# Patient Record
Sex: FEMALE | Race: WHITE | Employment: OTHER | URBAN - METROPOLITAN AREA
[De-identification: names, ages, dates, MRNs, and addresses within clinical notes are randomized per-mention and may not be internally consistent; named-entity substitution may affect disease eponyms.]

---

## 2017-01-11 ENCOUNTER — OFFICE VISIT (OUTPATIENT)
Dept: OTOLARYNGOLOGY | Facility: CLINIC | Age: 72
End: 2017-01-11

## 2017-01-11 ENCOUNTER — TELEPHONE (OUTPATIENT)
Dept: OTOLARYNGOLOGY | Facility: CLINIC | Age: 72
End: 2017-01-11

## 2017-01-11 ENCOUNTER — OFFICE VISIT (OUTPATIENT)
Dept: AUDIOLOGY | Facility: CLINIC | Age: 72
End: 2017-01-11

## 2017-01-11 VITALS
HEART RATE: 64 BPM | DIASTOLIC BLOOD PRESSURE: 70 MMHG | TEMPERATURE: 98 F | SYSTOLIC BLOOD PRESSURE: 110 MMHG | WEIGHT: 100 LBS | BODY MASS INDEX: 18 KG/M2

## 2017-01-11 DIAGNOSIS — H93.12 TINNITUS, LEFT: Primary | ICD-10-CM

## 2017-01-11 DIAGNOSIS — H93.A2 PULSATILE TINNITUS OF LEFT EAR: Primary | ICD-10-CM

## 2017-01-11 PROCEDURE — 92570 ACOUSTIC IMMITANCE TESTING: CPT | Performed by: AUDIOLOGIST

## 2017-01-11 PROCEDURE — 99244 OFF/OP CNSLTJ NEW/EST MOD 40: CPT | Performed by: OTOLARYNGOLOGY

## 2017-01-11 PROCEDURE — G0463 HOSPITAL OUTPT CLINIC VISIT: HCPCS | Performed by: OTOLARYNGOLOGY

## 2017-01-11 PROCEDURE — 92557 COMPREHENSIVE HEARING TEST: CPT | Performed by: AUDIOLOGIST

## 2017-01-11 NOTE — PROGRESS NOTES
AUDIOGRAM     Aguila Richmond was referred for testing by Dr. Kin Rajput for left sided pulsatile tinnitus   8/6/1945  [id number]      Otoscopic inspection: right ear no cerumen; left ear no cerumen.        Tests/Procedures  Patient was tested via  st

## 2017-01-11 NOTE — TELEPHONE ENCOUNTER
Per [facility] no prior auth required for CPT [id number]; ref # [id number]. Pt informed of this, and that she should contact her ins company to verify benefits and any OOP costs. Pt verbalized understanding.

## 2017-01-11 NOTE — PATIENT INSTRUCTIONS
Anatomy of the Brain  The brain controls the body. You can move and feel because of the brain. And it is the brain that makes you able to think, to show emotions, and to make judgments. The brain is protected by the skull, tissue, and fluid.   Functions o

## 2017-01-11 NOTE — PROGRESS NOTES
Vicki Duran is a 66year old female. Patient presents with:  Ear Problem: Pt presents with throbbing sound in left ear x several months.  Pt state sometimes dizziness      1/11/2017   Patient presents for evaluation of tinnitus This has been going on Carin for diverticulitis    INGUINAL HERNIA REPAIR  11/2014    Comment repair of incarcerated incisional hernia L groin Dr. Vannessa Mcdonnell Bilateral 2006    Comment Dr Nasim Angel Neg/Pos Details   Con Supraclavicular. Nose/Mouth/Throat Normal External nose - Normal. Lips/teeth/gums - Normal. Tonsilsnl. uvula is midline.  Tongue is normal with  Normal mobility and mucosa is free of lesions Oropharynx -cobblestoning      Nares - Right: Normal Left

## 2017-01-15 ENCOUNTER — HOSPITAL ENCOUNTER (OUTPATIENT)
Dept: CT IMAGING | Facility: HOSPITAL | Age: 72
Discharge: HOME | End: 2017-01-15
Attending: OTOLARYNGOLOGY
Payer: MEDICARE

## 2017-01-15 DIAGNOSIS — H93.A2 PULSATILE TINNITUS OF LEFT EAR: ICD-10-CM

## 2017-01-15 LAB — CREAT BLD-MCNC: 0.8 MG/DL (ref 0.5–1.5)

## 2017-01-15 PROCEDURE — 82565 ASSAY OF CREATININE: CPT

## 2017-01-15 PROCEDURE — 70482 CT ORBIT/EAR/FOSSA W/O&W/DYE: CPT

## 2017-01-17 ENCOUNTER — TELEPHONE (OUTPATIENT)
Dept: OTOLARYNGOLOGY | Facility: CLINIC | Age: 72
End: 2017-01-17

## 2017-02-15 ENCOUNTER — TELEPHONE (OUTPATIENT)
Dept: INTERNAL MEDICINE CLINIC | Facility: CLINIC | Age: 72
End: 2017-02-15

## 2017-02-16 NOTE — TELEPHONE ENCOUNTER
To nursing, please fax back to the [facility] fitness center physicians consent form that I signed. Please let patient know that was faxed. Thanks.

## 2017-03-07 ENCOUNTER — LAB REQUISITION (OUTPATIENT)
Dept: LAB | Facility: HOSPITAL | Age: 72
End: 2017-03-07
Payer: MEDICARE

## 2017-03-07 DIAGNOSIS — M67.441 GANGLION OF RIGHT HAND: ICD-10-CM

## 2017-03-07 PROCEDURE — 88304 TISSUE EXAM BY PATHOLOGIST: CPT | Performed by: SURGERY

## 2017-03-07 PROCEDURE — 88305 TISSUE EXAM BY PATHOLOGIST: CPT | Performed by: SURGERY

## 2017-04-26 ENCOUNTER — OFFICE VISIT (OUTPATIENT)
Dept: OCCUPATIONAL MEDICINE | Facility: HOSPITAL | Age: 72
End: 2017-04-26
Attending: SURGERY
Payer: MEDICARE

## 2017-04-26 DIAGNOSIS — Z98.890 STATUS POST DEBRIDEMENT OF BONE SPUR: ICD-10-CM

## 2017-04-26 DIAGNOSIS — M25.649 THUMB JOINT STIFFNESS: Primary | ICD-10-CM

## 2017-04-26 DIAGNOSIS — L90.5: ICD-10-CM

## 2017-04-26 PROCEDURE — 97165 OT EVAL LOW COMPLEX 30 MIN: CPT | Performed by: OCCUPATIONAL THERAPIST

## 2017-04-26 PROCEDURE — 97530 THERAPEUTIC ACTIVITIES: CPT | Performed by: OCCUPATIONAL THERAPIST

## 2017-04-26 NOTE — PROGRESS NOTES
OCCUPATIONAL THERAPY UPPER EXTREMITY EVALUATION:   Referring Physician: Dr. Hiram Elias  Date of onset: 2016  Diagnosis: S/P removal of bone spurs and cyst from dorsum of thumb  Date of Service: 4/26/2017  Date of Surgery: 03/07/17     PATIENT SUMMARY:   Laurie Gallo above- noted skills. Precautions: None        OBJECTIVE:   OBSERVATION:Patient was seen for initial evaluation, fluidotherapy, AROM, isometric exercises and HEP instruction.     SCAR: Adhered Mod    SENSORY: tingling over scar      AROM:  Shoulder  Wri program    Education or treatment limitation: None  Rehab Potential:good     FOTO: 70/100  Current status G Code: Initial Carrying, Moving and Handling Objects CJ: 20-39% impaired, limited, or restricted  Goal status G Code: Carrying, Moving and Handling O

## 2017-04-28 ENCOUNTER — OFFICE VISIT (OUTPATIENT)
Dept: OCCUPATIONAL MEDICINE | Facility: HOSPITAL | Age: 72
End: 2017-04-28
Attending: SURGERY
Payer: MEDICARE

## 2017-04-28 DIAGNOSIS — Z98.890 STATUS POST DEBRIDEMENT OF BONE SPUR: ICD-10-CM

## 2017-04-28 DIAGNOSIS — L90.5: ICD-10-CM

## 2017-04-28 DIAGNOSIS — M25.649 THUMB JOINT STIFFNESS: Primary | ICD-10-CM

## 2017-04-28 PROCEDURE — 97110 THERAPEUTIC EXERCISES: CPT | Performed by: OCCUPATIONAL THERAPIST

## 2017-04-28 PROCEDURE — 97140 MANUAL THERAPY 1/> REGIONS: CPT | Performed by: OCCUPATIONAL THERAPIST

## 2017-04-28 PROCEDURE — 97035 APP MDLTY 1+ULTRASOUND EA 15: CPT | Performed by: OCCUPATIONAL THERAPIST

## 2017-04-28 NOTE — PROGRESS NOTES
Diagnosis:  S/P removal of bone spurs and cyst from dorsum of thumb  Authorized # of Visits:  8         Next MD visit: none scheduled  Fall Risk: standard         Precautions: n/a           Medication Changes since last visit?: No  Subjective:  \"The top

## 2017-05-01 ENCOUNTER — OFFICE VISIT (OUTPATIENT)
Dept: OCCUPATIONAL MEDICINE | Facility: HOSPITAL | Age: 72
End: 2017-05-01
Attending: SURGERY
Payer: MEDICARE

## 2017-05-01 DIAGNOSIS — Z98.890 STATUS POST DEBRIDEMENT OF BONE SPUR: ICD-10-CM

## 2017-05-01 DIAGNOSIS — M25.649 THUMB JOINT STIFFNESS: Primary | ICD-10-CM

## 2017-05-01 DIAGNOSIS — L90.5: ICD-10-CM

## 2017-05-01 PROCEDURE — 97110 THERAPEUTIC EXERCISES: CPT | Performed by: OCCUPATIONAL THERAPIST

## 2017-05-01 PROCEDURE — 97140 MANUAL THERAPY 1/> REGIONS: CPT | Performed by: OCCUPATIONAL THERAPIST

## 2017-05-01 PROCEDURE — 97018 PARAFFIN BATH THERAPY: CPT | Performed by: OCCUPATIONAL THERAPIST

## 2017-05-01 NOTE — PROGRESS NOTES
Diagnosis:  S/P removal of bone spurs and cyst from dorsum of thumb  Authorized # of Visits:  8         Next MD visit: none scheduled  Fall Risk: standard         Precautions: n/a           Medication Changes since last visit?: No  Subjective: \"I can wr and promoting thumb IP AROM, trial fluidotherapy next session      Charges: MTTE2P Total Timed Treatment: 45 min  Total Treatment Time: 50 min

## 2017-05-03 ENCOUNTER — APPOINTMENT (OUTPATIENT)
Dept: OCCUPATIONAL MEDICINE | Facility: HOSPITAL | Age: 72
End: 2017-05-03
Attending: SURGERY
Payer: MEDICARE

## 2017-05-08 ENCOUNTER — OFFICE VISIT (OUTPATIENT)
Dept: OCCUPATIONAL MEDICINE | Facility: HOSPITAL | Age: 72
End: 2017-05-08
Attending: SURGERY
Payer: MEDICARE

## 2017-05-08 DIAGNOSIS — Z98.890 STATUS POST DEBRIDEMENT OF BONE SPUR: ICD-10-CM

## 2017-05-08 DIAGNOSIS — L90.5: ICD-10-CM

## 2017-05-08 DIAGNOSIS — M25.649 THUMB JOINT STIFFNESS: Primary | ICD-10-CM

## 2017-05-08 PROCEDURE — 97140 MANUAL THERAPY 1/> REGIONS: CPT | Performed by: OCCUPATIONAL THERAPIST

## 2017-05-08 PROCEDURE — 97110 THERAPEUTIC EXERCISES: CPT | Performed by: OCCUPATIONAL THERAPIST

## 2017-05-08 NOTE — PROGRESS NOTES
Diagnosis:  S/P removal of bone spurs and cyst from dorsum of thumb  Authorized # of Visits:  8         Next MD visit: none scheduled  Fall Risk: standard         Precautions: n/a           Medication Changes since last visit?: No  Subjective: \"I still session well. Slight increase in thumb flexion by 7 degrees. Goals:    Pt will present with improved Functional Severity modifier to:CI  Pt complaints of pain in thumb will decrease at worst to 1/10.   Pt will be independent and compliant with comprehe

## 2017-05-10 ENCOUNTER — APPOINTMENT (OUTPATIENT)
Dept: OCCUPATIONAL MEDICINE | Facility: HOSPITAL | Age: 72
End: 2017-05-10
Attending: SURGERY
Payer: MEDICARE

## 2017-05-15 ENCOUNTER — OFFICE VISIT (OUTPATIENT)
Dept: OCCUPATIONAL MEDICINE | Facility: HOSPITAL | Age: 72
End: 2017-05-15
Attending: SURGERY
Payer: MEDICARE

## 2017-05-15 DIAGNOSIS — M25.649 THUMB JOINT STIFFNESS: Primary | ICD-10-CM

## 2017-05-15 DIAGNOSIS — Z98.890 STATUS POST DEBRIDEMENT OF BONE SPUR: ICD-10-CM

## 2017-05-15 DIAGNOSIS — L90.5: ICD-10-CM

## 2017-05-15 PROCEDURE — 97110 THERAPEUTIC EXERCISES: CPT | Performed by: OCCUPATIONAL THERAPIST

## 2017-05-15 PROCEDURE — 97035 APP MDLTY 1+ULTRASOUND EA 15: CPT | Performed by: OCCUPATIONAL THERAPIST

## 2017-05-15 PROCEDURE — 97140 MANUAL THERAPY 1/> REGIONS: CPT | Performed by: OCCUPATIONAL THERAPIST

## 2017-05-15 NOTE — PROGRESS NOTES
Diagnosis:  S/P removal of bone spurs and cyst from dorsum of thumb  Authorized # of Visits:  8         Next MD visit: none scheduled  Fall Risk: standard         Precautions: n/a           Medication Changes since last visit?: No  Subjective: \"I think continuous 100% 3.0 MHZ, 0.9 w/cm2 8 min over dorsum of thumb IP      Scar massage- 5 min Thumb extension flicks of POM POMs Velcro checkers, two point pinch, and three point pinch with green CP Velcro checkers, two point pinch, and three point pinch with

## 2017-05-17 ENCOUNTER — OFFICE VISIT (OUTPATIENT)
Dept: OCCUPATIONAL MEDICINE | Facility: HOSPITAL | Age: 72
End: 2017-05-17
Attending: SURGERY
Payer: MEDICARE

## 2017-05-17 DIAGNOSIS — M25.649 THUMB JOINT STIFFNESS: Primary | ICD-10-CM

## 2017-05-17 DIAGNOSIS — Z98.890 STATUS POST DEBRIDEMENT OF BONE SPUR: ICD-10-CM

## 2017-05-17 DIAGNOSIS — L90.5: ICD-10-CM

## 2017-05-17 PROCEDURE — 97035 APP MDLTY 1+ULTRASOUND EA 15: CPT | Performed by: OCCUPATIONAL THERAPIST

## 2017-05-17 PROCEDURE — 97140 MANUAL THERAPY 1/> REGIONS: CPT | Performed by: OCCUPATIONAL THERAPIST

## 2017-05-17 PROCEDURE — 97110 THERAPEUTIC EXERCISES: CPT | Performed by: OCCUPATIONAL THERAPIST

## 2017-05-17 NOTE — PROGRESS NOTES
Diagnosis:  S/P removal of bone spurs and cyst from dorsum of thumb  Authorized # of Visits:  8         Next MD visit: none scheduled  Fall Risk: standard         Precautions: n/a           Medication Changes since last visit?: No  Subjective: \"I think Sensory bin#3 large chips Sensory bin#3 large chips Sensory bin#3 large chips       Ultrasound pulsed 20%, 3.0 MHZ, 0.9 w/cm2 8 min over dorsum of thumb IP Pronation supination with 2# wt x 20, 1 set Pronation supination with 2# wt x 20, 1 set Ultrasound

## 2017-05-22 ENCOUNTER — APPOINTMENT (OUTPATIENT)
Dept: OCCUPATIONAL MEDICINE | Facility: HOSPITAL | Age: 72
End: 2017-05-22
Attending: SURGERY
Payer: MEDICARE

## 2017-05-24 ENCOUNTER — APPOINTMENT (OUTPATIENT)
Dept: OCCUPATIONAL MEDICINE | Facility: HOSPITAL | Age: 72
End: 2017-05-24
Attending: SURGERY
Payer: MEDICARE

## 2017-06-05 ENCOUNTER — APPOINTMENT (OUTPATIENT)
Dept: OCCUPATIONAL MEDICINE | Facility: HOSPITAL | Age: 72
End: 2017-06-05
Attending: SURGERY
Payer: MEDICARE

## 2017-06-08 ENCOUNTER — APPOINTMENT (OUTPATIENT)
Dept: OCCUPATIONAL MEDICINE | Facility: HOSPITAL | Age: 72
End: 2017-06-08
Attending: SURGERY
Payer: MEDICARE

## 2017-08-30 ENCOUNTER — LAB ENCOUNTER (OUTPATIENT)
Dept: LAB | Age: 72
End: 2017-08-30
Attending: INTERNAL MEDICINE
Payer: MEDICARE

## 2017-08-30 ENCOUNTER — TELEPHONE (OUTPATIENT)
Dept: INTERNAL MEDICINE CLINIC | Facility: CLINIC | Age: 72
End: 2017-08-30

## 2017-08-30 ENCOUNTER — OFFICE VISIT (OUTPATIENT)
Dept: INTERNAL MEDICINE CLINIC | Facility: CLINIC | Age: 72
End: 2017-08-30

## 2017-08-30 VITALS
SYSTOLIC BLOOD PRESSURE: 114 MMHG | HEIGHT: 61.75 IN | TEMPERATURE: 98 F | WEIGHT: 97 LBS | BODY MASS INDEX: 17.85 KG/M2 | DIASTOLIC BLOOD PRESSURE: 62 MMHG | OXYGEN SATURATION: 98 % | HEART RATE: 71 BPM

## 2017-08-30 DIAGNOSIS — Z86.69 HX OF MIGRAINES: ICD-10-CM

## 2017-08-30 DIAGNOSIS — Z00.00 MEDICARE ANNUAL WELLNESS VISIT, SUBSEQUENT: Primary | ICD-10-CM

## 2017-08-30 DIAGNOSIS — H93.A2 PULSATILE TINNITUS OF LEFT EAR: ICD-10-CM

## 2017-08-30 DIAGNOSIS — E78.00 HYPERCHOLESTEROLEMIA: ICD-10-CM

## 2017-08-30 DIAGNOSIS — H90.3 SENSORINEURAL HEARING LOSS (SNHL) OF BOTH EARS: ICD-10-CM

## 2017-08-30 DIAGNOSIS — Z12.31 VISIT FOR SCREENING MAMMOGRAM: ICD-10-CM

## 2017-08-30 DIAGNOSIS — M85.80 OSTEOPENIA, UNSPECIFIED LOCATION: ICD-10-CM

## 2017-08-30 DIAGNOSIS — K57.30 DIVERTICULOSIS OF LARGE INTESTINE WITHOUT HEMORRHAGE: ICD-10-CM

## 2017-08-30 DIAGNOSIS — M17.12 PRIMARY OSTEOARTHRITIS OF LEFT KNEE: ICD-10-CM

## 2017-08-30 DIAGNOSIS — K59.09 CHRONIC CONSTIPATION: ICD-10-CM

## 2017-08-30 LAB
ALBUMIN SERPL BCP-MCNC: 4.3 G/DL (ref 3.5–4.8)
ALBUMIN/GLOB SERPL: 1.8 {RATIO} (ref 1–2)
ALP SERPL-CCNC: 77 U/L (ref 32–100)
ALT SERPL-CCNC: 24 U/L (ref 14–54)
ANION GAP SERPL CALC-SCNC: 6 MMOL/L (ref 0–18)
AST SERPL-CCNC: 22 U/L (ref 15–41)
BASOPHILS # BLD: 0 K/UL (ref 0–0.2)
BASOPHILS NFR BLD: 1 %
BILIRUB SERPL-MCNC: 0.7 MG/DL (ref 0.3–1.2)
BILIRUB UR QL: NEGATIVE
BUN SERPL-MCNC: 7 MG/DL (ref 8–20)
BUN/CREAT SERPL: 9.7 (ref 10–20)
CALCIUM SERPL-MCNC: 9.5 MG/DL (ref 8.5–10.5)
CHLORIDE SERPL-SCNC: 104 MMOL/L (ref 95–110)
CHOLEST SERPL-MCNC: 157 MG/DL (ref 110–200)
CLARITY UR: CLEAR
CO2 SERPL-SCNC: 28 MMOL/L (ref 22–32)
COLOR UR: YELLOW
CREAT SERPL-MCNC: 0.72 MG/DL (ref 0.5–1.5)
EOSINOPHIL # BLD: 0.1 K/UL (ref 0–0.7)
EOSINOPHIL NFR BLD: 1 %
ERYTHROCYTE [DISTWIDTH] IN BLOOD BY AUTOMATED COUNT: 13.2 % (ref 11–15)
GLOBULIN PLAS-MCNC: 2.4 G/DL (ref 2.5–3.7)
GLUCOSE SERPL-MCNC: 89 MG/DL (ref 70–99)
GLUCOSE UR-MCNC: NEGATIVE MG/DL
HCT VFR BLD AUTO: 41.6 % (ref 35–48)
HDLC SERPL-MCNC: 59 MG/DL
HGB BLD-MCNC: 14.3 G/DL (ref 12–16)
HGB UR QL STRIP.AUTO: NEGATIVE
KETONES UR-MCNC: NEGATIVE MG/DL
LDLC SERPL CALC-MCNC: 86 MG/DL (ref 0–99)
LEUKOCYTE ESTERASE UR QL STRIP.AUTO: NEGATIVE
LYMPHOCYTES # BLD: 1 K/UL (ref 1–4)
LYMPHOCYTES NFR BLD: 20 %
MCH RBC QN AUTO: 30.8 PG (ref 27–32)
MCHC RBC AUTO-ENTMCNC: 34.3 G/DL (ref 32–37)
MCV RBC AUTO: 90 FL (ref 80–100)
MONOCYTES # BLD: 0.4 K/UL (ref 0–1)
MONOCYTES NFR BLD: 8 %
NEUTROPHILS # BLD AUTO: 3.5 K/UL (ref 1.8–7.7)
NEUTROPHILS NFR BLD: 70 %
NITRITE UR QL STRIP.AUTO: NEGATIVE
NONHDLC SERPL-MCNC: 98 MG/DL
OSMOLALITY UR CALC.SUM OF ELEC: 283 MOSM/KG (ref 275–295)
PH UR: 7 [PH] (ref 5–8)
PLATELET # BLD AUTO: 173 K/UL (ref 140–400)
PMV BLD AUTO: 10.2 FL (ref 7.4–10.3)
POTASSIUM SERPL-SCNC: 4.2 MMOL/L (ref 3.3–5.1)
PROT SERPL-MCNC: 6.7 G/DL (ref 5.9–8.4)
PROT UR-MCNC: NEGATIVE MG/DL
RBC # BLD AUTO: 4.62 M/UL (ref 3.7–5.4)
SODIUM SERPL-SCNC: 138 MMOL/L (ref 136–144)
SP GR UR STRIP: 1.01 (ref 1–1.03)
TRIGL SERPL-MCNC: 59 MG/DL (ref 1–149)
TSH SERPL-ACNC: 0.45 UIU/ML (ref 0.45–5.33)
UROBILINOGEN UR STRIP-ACNC: <2
VIT C UR-MCNC: NEGATIVE MG/DL
WBC # BLD AUTO: 5 K/UL (ref 4–11)

## 2017-08-30 PROCEDURE — 81003 URINALYSIS AUTO W/O SCOPE: CPT | Performed by: INTERNAL MEDICINE

## 2017-08-30 PROCEDURE — 99213 OFFICE O/P EST LOW 20 MIN: CPT | Performed by: INTERNAL MEDICINE

## 2017-08-30 PROCEDURE — 80053 COMPREHEN METABOLIC PANEL: CPT

## 2017-08-30 PROCEDURE — G0463 HOSPITAL OUTPT CLINIC VISIT: HCPCS | Performed by: INTERNAL MEDICINE

## 2017-08-30 PROCEDURE — 36415 COLL VENOUS BLD VENIPUNCTURE: CPT

## 2017-08-30 PROCEDURE — 85025 COMPLETE CBC W/AUTO DIFF WBC: CPT

## 2017-08-30 PROCEDURE — G0439 PPPS, SUBSEQ VISIT: HCPCS | Performed by: INTERNAL MEDICINE

## 2017-08-30 PROCEDURE — 84443 ASSAY THYROID STIM HORMONE: CPT

## 2017-08-30 PROCEDURE — 96160 PT-FOCUSED HLTH RISK ASSMT: CPT | Performed by: INTERNAL MEDICINE

## 2017-08-30 PROCEDURE — 80061 LIPID PANEL: CPT

## 2017-08-30 NOTE — PROGRESS NOTES
Purnima Lindquist is a 67year old female who presents for     Medicare annual wellness-medicare advantage     Saw Dr. Maldonado Montalvo for pulsatile tinnitus in L ear in Jan 2017. Still comes and goes.    On audiogram 1/2017-mild to moderate sensorineural hearing lo HERNIA REPAIR      Comment: repair of incarcerated incisional hernia L                groin Dr. Robin Luna  03/2017: OTHER SURGICAL HISTORY Right      Comment: R thumb cyst surgery Dr. Amber Agustin                3/2017-for ganglion cyst and bone spurs. stool in rectal vault  Extremities: no edema  Neurologic: alert and oriented      ASSESSMENT AND PLAN:     Medicare annual wellness. Hypercholesterolemia-on Lipitor 10 mg daily. LDL 78 in 10/2016-down from 173 in 8/2016 before tx.  Check lipid panel.    Take 1 capsule (100 mg total) by mouth daily as needed for constipation. Disp: 1 capsule Rfl: 0   Multiple Vitamin (MULTI-VITAMINS) Oral Tab Take 1 tablet by mouth daily. Disp:  Rfl:    aspirin 81 MG Oral Tab Take 1 tablet by mouth daily.  Disp:  Rfl: 1-Yes (Sometimes, arthritis)     Have you had any memory issues?: 0-No    Fall/Risk Scorin          Depression Screening (PHQ-2/PHQ-9): Over the LAST 2 WEEKS   Little interest or pleasure in doing things (over the last two weeks)?: Not at all    [facility] Assessment     What day of the week is this?: Correct    What month is it?: Correct    What year is it?: Correct    Recall \"Ball\": Correct    Recall \"Flag\": Correct    Recall \"Tree\": Correct      Kaylyn Serra's SCREENING SCHEDULE      PREVENTATI Hepatitis B No orders found for this or any previous visit.  Update Immunization Activity if applicable    Tetanus   Orders placed or performed in visit on 08/31/16  -TETANUS, DIPHTHERIA TOXOIDS AND ACELLULAR PERTUSIS VACCINE (TDAP), >7 YEARS, IM USE    Upd

## 2017-08-30 NOTE — TELEPHONE ENCOUNTER
Spoke with patient and relayed Dr. Ismael Vieyra' message that the results for the labs she did today came out normal. Patient verbalized understanding.

## 2017-08-30 NOTE — TELEPHONE ENCOUNTER
To nursing, please tell patient lab done at today's visit–results are normal.    Thanks. Note to self–8/30/17–CBC CMP TSH lipid UA–results okay.

## 2017-09-05 ENCOUNTER — APPOINTMENT (OUTPATIENT)
Dept: LAB | Facility: HOSPITAL | Age: 72
End: 2017-09-05
Attending: INTERNAL MEDICINE
Payer: MEDICARE

## 2017-09-05 DIAGNOSIS — K59.09 CHRONIC CONSTIPATION: ICD-10-CM

## 2017-09-05 DIAGNOSIS — Z00.00 MEDICARE ANNUAL WELLNESS VISIT, SUBSEQUENT: ICD-10-CM

## 2017-09-05 LAB — HEMOCCULT STL QL: NEGATIVE

## 2017-09-05 PROCEDURE — 82274 ASSAY TEST FOR BLOOD FECAL: CPT

## 2017-09-06 ENCOUNTER — TELEPHONE (OUTPATIENT)
Dept: INTERNAL MEDICINE CLINIC | Facility: CLINIC | Age: 72
End: 2017-09-06

## 2017-09-06 NOTE — TELEPHONE ENCOUNTER
To nursing, please tell patient results of the stool test for occult blood–result is negative (normal). Thanks.       (Test was fecal immunoassay–FIT test)

## 2017-09-07 NOTE — TELEPHONE ENCOUNTER
Patient called back. Results relayed for stool test for occult blood which is negative (normal). Patient expressed relief and verbalized understanding.

## 2017-09-28 ENCOUNTER — HOSPITAL ENCOUNTER (OUTPATIENT)
Dept: MAMMOGRAPHY | Facility: HOSPITAL | Age: 72
Discharge: HOME | End: 2017-09-28
Attending: INTERNAL MEDICINE
Payer: MEDICARE

## 2017-09-28 DIAGNOSIS — Z12.31 VISIT FOR SCREENING MAMMOGRAM: ICD-10-CM

## 2017-09-28 PROCEDURE — 77067 SCR MAMMO BI INCL CAD: CPT | Performed by: INTERNAL MEDICINE

## 2017-09-29 ENCOUNTER — OFFICE VISIT (OUTPATIENT)
Dept: INTERNAL MEDICINE CLINIC | Facility: CLINIC | Age: 72
End: 2017-09-29

## 2017-09-29 VITALS
HEIGHT: 61.75 IN | BODY MASS INDEX: 18.03 KG/M2 | HEART RATE: 72 BPM | SYSTOLIC BLOOD PRESSURE: 120 MMHG | DIASTOLIC BLOOD PRESSURE: 64 MMHG | TEMPERATURE: 99 F | WEIGHT: 98 LBS

## 2017-09-29 DIAGNOSIS — L50.9 URTICARIA: Primary | ICD-10-CM

## 2017-09-29 PROCEDURE — 99214 OFFICE O/P EST MOD 30 MIN: CPT | Performed by: INTERNAL MEDICINE

## 2017-09-29 PROCEDURE — 99212 OFFICE O/P EST SF 10 MIN: CPT | Performed by: INTERNAL MEDICINE

## 2017-09-29 RX ORDER — METHYLPREDNISOLONE 4 MG/1
TABLET ORAL
Qty: 1 KIT | Refills: 0 | Status: SHIPPED | OUTPATIENT
Start: 2017-09-29 | End: 2017-10-06

## 2017-09-29 RX ORDER — HYDROXYZINE HYDROCHLORIDE 10 MG/1
10 TABLET, FILM COATED ORAL 3 TIMES DAILY PRN
Qty: 20 TABLET | Refills: 1 | Status: SHIPPED | OUTPATIENT
Start: 2017-09-29 | End: 2017-10-06

## 2017-09-29 NOTE — PROGRESS NOTES
Reda Najjar is a 67year old female who presents for     Hives:  Started on 9/18/17. Had pink and raised \"welts\" on both cheeks. Took no meds for welts. Disappeared at night.has occurred on trunk. \"a little bit\" on legs, not arms.  Had one on butt Onset   • Breast Cancer Mother 76   • Lipids Mother    • Lung Disorder Mother    • COPD [OTHER] Mother       age 80   •  unknown cause age 61 [OTHER] Father       Social History:   Smoking status: Never Smoker not better. Patient expresses understanding of above issues and plan. Current Outpatient Prescriptions:  HydrOXYzine HCl 10 MG Oral Tab Take 1 tablet (10 mg total) by mouth 3 (three) times daily as needed for Itching (hives).  Disp: 20 tablet Rfl:

## 2017-10-02 ENCOUNTER — TELEPHONE (OUTPATIENT)
Dept: INTERNAL MEDICINE CLINIC | Facility: CLINIC | Age: 72
End: 2017-10-02

## 2017-10-02 NOTE — TELEPHONE ENCOUNTER
Neftali faxed a PA for Hydroxyzine  HCI Oral Tabs  10 mgs. #20. Please call [phone]. Pt. ID # is [id number].

## 2017-10-04 ENCOUNTER — OFFICE VISIT (OUTPATIENT)
Dept: ALLERGY | Facility: CLINIC | Age: 72
End: 2017-10-04

## 2017-10-04 VITALS
RESPIRATION RATE: 18 BRPM | SYSTOLIC BLOOD PRESSURE: 112 MMHG | BODY MASS INDEX: 18.95 KG/M2 | WEIGHT: 100.38 LBS | OXYGEN SATURATION: 96 % | TEMPERATURE: 98 F | HEART RATE: 74 BPM | HEIGHT: 61 IN | DIASTOLIC BLOOD PRESSURE: 60 MMHG

## 2017-10-04 DIAGNOSIS — L50.8 ACUTE URTICARIA: Primary | ICD-10-CM

## 2017-10-04 PROCEDURE — 99212 OFFICE O/P EST SF 10 MIN: CPT | Performed by: ALLERGY & IMMUNOLOGY

## 2017-10-04 PROCEDURE — 99204 OFFICE O/P NEW MOD 45 MIN: CPT | Performed by: ALLERGY & IMMUNOLOGY

## 2017-10-04 RX ORDER — LEVOCETIRIZINE DIHYDROCHLORIDE 5 MG/1
5 TABLET, FILM COATED ORAL NIGHTLY
Qty: 30 TABLET | Refills: 0 | Status: SHIPPED | OUTPATIENT
Start: 2017-10-04 | End: 2018-09-05 | Stop reason: ALTCHOICE

## 2017-10-04 NOTE — PATIENT INSTRUCTIONS
Recs: Handouts on urticaria provided and reviewed. Reviewed most episodes of acute urticaria resolved in 4-6 weeks.   Trial of Xyzal 5 mg once a night at bedtime  May add Claritin 10 mg once a day in the morning if refractory to Xyzal  Benadryl 25 mg every

## 2017-10-04 NOTE — PROGRESS NOTES
Rachana Parmar is a 67year old female. HPI:   Patient presents with:  Hives: on and of full body diferent areas (starting sept 18th)    Patient is a 53-year-old female who presents for allergy evaluation with a chief complaint of rash/hives.     Mor spine in 1/15--right upper back pain resolved.     • Hyperlipidemia    • Inflammatory arthritis    • Internal hemorrhoids 2010    colonoscopy Dr. Liborio Waller 2010   • Left knee DJD    • Migraines     MRI brain 2003   • Osteopenia     dexa 2015   • Phlegmon 2012 MG Oral Tab Take 1 tablet by mouth daily. Disp:  Rfl:    HydrOXYzine HCl 10 MG Oral Tab Take 1 tablet (10 mg total) by mouth 3 (three) times daily as needed for Itching (hives).  Disp: 20 tablet Rfl: 1   methylPREDNISolone (MEDROL) 4 MG Oral Tablet Therapy edematous papule 13 mm in size.,  Blanchable  Extremities: no edema, cyanosis, or clubbing  Neurological:Oriented to time, place, person & situation       ASSESSMENT/PLAN:   Assessment   Acute urticaria  (primary encounter diagnosis)    Patient is a 72-yea patient by myself. Teaching included  a review of potential adverse side effects as well as potential efficacy. Patient's questions were answered in regards to medication administration and dosing and potential side effects.  Teaching was provided via the

## 2017-10-05 ENCOUNTER — PATIENT MESSAGE (OUTPATIENT)
Dept: INTERNAL MEDICINE CLINIC | Facility: CLINIC | Age: 72
End: 2017-10-05

## 2017-10-05 ENCOUNTER — PATIENT MESSAGE (OUTPATIENT)
Dept: ALLERGY | Facility: CLINIC | Age: 72
End: 2017-10-05

## 2017-10-05 NOTE — TELEPHONE ENCOUNTER
Per Dr. Christ Candelario verbally pt cleared to receive flu vaccine at this time. My chart message sent to pt.

## 2017-10-05 NOTE — TELEPHONE ENCOUNTER
From: Parth Bridges  To: Misha John MD  Sent: 10/5/2017 11:16 AM CDT  Subject: Non-Urgent Medical Question    Leia Schooling to keep bothering you, but I forgot to ask you while we were on the phone, if it would be o.k. to get my flu shot right n

## 2017-10-05 NOTE — TELEPHONE ENCOUNTER
Message reviewed and noted. Agree with triage advice provided. okay to receive flu vaccine from an allergy perspective.

## 2017-10-05 NOTE — TELEPHONE ENCOUNTER
Please see other 10/5/17 mychart encounter for response from Willis Perez MD Physician Signed  Date of Service: 10/05/2017 12:44 PM   Bookmark Copy       Message reviewed and noted. Agree with triage advice provided.    okay to receive

## 2017-10-05 NOTE — TELEPHONE ENCOUNTER
[phone] (H)    Discussed with patient that PN/TN panel included added positive histamine. Pt stts that no additional hives noted in any areas and no issues eating nuts in past. Pt advised to use hydrocortisone, or benadryl as needed.  Pt verbalized und

## 2017-10-05 NOTE — TELEPHONE ENCOUNTER
From: Sherin Villatoro  To: Pete Haney MD  Sent: 10/5/2017 8:30 AM CDT  Subject: Other    In regard to prescriptions suggested by Dr. Danilo Scales on 9/29 (HydrOXYzine HCI and methylPrednisolone), Dr. Ramirez Patel, the allergist that Dr. Piyush Beal suggested instead

## 2017-10-05 NOTE — TELEPHONE ENCOUNTER
From: Rachel Becker  To: Martin Rossi MD  Sent: 10/5/2017 9:59 AM CDT  Subject: Test Results Question    I have a welt on my left arm where the nurse pricked me to test for nut allergies during my office visit , yesterday, October 4. . It is not v

## 2017-10-27 RX ORDER — ATORVASTATIN CALCIUM 10 MG/1
TABLET, FILM COATED ORAL
Qty: 90 TABLET | Refills: 3 | Status: SHIPPED | OUTPATIENT
Start: 2017-10-27 | End: 2018-09-06

## 2018-04-25 ENCOUNTER — HOSPITAL ENCOUNTER (OUTPATIENT)
Age: 73
Discharge: HOME | End: 2018-04-25
Attending: EMERGENCY MEDICINE
Payer: MEDICARE

## 2018-04-25 VITALS
OXYGEN SATURATION: 98 % | DIASTOLIC BLOOD PRESSURE: 72 MMHG | HEART RATE: 60 BPM | HEIGHT: 62 IN | SYSTOLIC BLOOD PRESSURE: 121 MMHG | WEIGHT: 100 LBS | BODY MASS INDEX: 18.4 KG/M2 | TEMPERATURE: 98 F | RESPIRATION RATE: 18 BRPM

## 2018-04-25 DIAGNOSIS — S61.512A LACERATION OF LEFT WRIST, INITIAL ENCOUNTER: Primary | ICD-10-CM

## 2018-04-25 PROCEDURE — 99212 OFFICE O/P EST SF 10 MIN: CPT

## 2018-04-25 RX ORDER — GINSENG 100 MG
CAPSULE ORAL ONCE
Status: COMPLETED | OUTPATIENT
Start: 2018-04-25 | End: 2018-04-25

## 2018-04-25 NOTE — ED PROVIDER NOTES
Patient Seen in: [facility]    History   No chief complaint on file.     Stated Complaint: laceration    HPI  2 days ago patient was using a sauna push in the garden when she \"tapped\" the back of her right wrist with the HERNIA REPAIR      Comment: repair of incarcerated incisional hernia L                groin Dr. Bety Wharton  03/2017: OTHER SURGICAL HISTORY Right      Comment: R thumb cyst surgery Dr. Frank Morrell                3/2017-for ganglion cyst and bone spurs. normal.   Skin: Skin is warm and dry. No pallor. Psychiatric: She has a normal mood and affect. Her behavior is normal.   Nursing note and vitals reviewed.         ED Course   Labs Reviewed - No data to display    ED Course as of Apr 25 0906  ------------

## 2018-04-25 NOTE — ED INITIAL ASSESSMENT (HPI)
Left forearm skin abrasion while gardening last Monday, now fells more sore and felt warm, denies drainage

## 2018-09-05 ENCOUNTER — OFFICE VISIT (OUTPATIENT)
Dept: INTERNAL MEDICINE CLINIC | Facility: CLINIC | Age: 73
End: 2018-09-05
Payer: MEDICARE

## 2018-09-05 ENCOUNTER — LAB ENCOUNTER (OUTPATIENT)
Dept: LAB | Age: 73
End: 2018-09-05
Attending: INTERNAL MEDICINE
Payer: MEDICARE

## 2018-09-05 VITALS
SYSTOLIC BLOOD PRESSURE: 122 MMHG | HEIGHT: 63 IN | TEMPERATURE: 98 F | DIASTOLIC BLOOD PRESSURE: 78 MMHG | HEART RATE: 61 BPM | BODY MASS INDEX: 16.83 KG/M2 | WEIGHT: 95 LBS | OXYGEN SATURATION: 99 %

## 2018-09-05 DIAGNOSIS — Z86.69 HX OF MIGRAINES: ICD-10-CM

## 2018-09-05 DIAGNOSIS — K57.30 DIVERTICULOSIS OF LARGE INTESTINE WITHOUT HEMORRHAGE: ICD-10-CM

## 2018-09-05 DIAGNOSIS — H90.3 SENSORINEURAL HEARING LOSS (SNHL) OF BOTH EARS: ICD-10-CM

## 2018-09-05 DIAGNOSIS — K59.09 CHRONIC CONSTIPATION: ICD-10-CM

## 2018-09-05 DIAGNOSIS — E78.00 HYPERCHOLESTEROLEMIA: ICD-10-CM

## 2018-09-05 DIAGNOSIS — Z00.00 MEDICARE ANNUAL WELLNESS VISIT, SUBSEQUENT: ICD-10-CM

## 2018-09-05 DIAGNOSIS — H93.A2 PULSATILE TINNITUS OF LEFT EAR: ICD-10-CM

## 2018-09-05 DIAGNOSIS — Z12.11 COLON CANCER SCREENING: ICD-10-CM

## 2018-09-05 DIAGNOSIS — Z12.31 VISIT FOR SCREENING MAMMOGRAM: ICD-10-CM

## 2018-09-05 DIAGNOSIS — M17.12 PRIMARY OSTEOARTHRITIS OF LEFT KNEE: ICD-10-CM

## 2018-09-05 DIAGNOSIS — M85.80 OSTEOPENIA, UNSPECIFIED LOCATION: ICD-10-CM

## 2018-09-05 LAB
ALBUMIN SERPL BCP-MCNC: 4.2 G/DL (ref 3.5–4.8)
ALBUMIN/GLOB SERPL: 1.8 {RATIO} (ref 1–2)
ALP SERPL-CCNC: 71 U/L (ref 32–100)
ALT SERPL-CCNC: 21 U/L (ref 14–54)
ANION GAP SERPL CALC-SCNC: 7 MMOL/L (ref 0–18)
AST SERPL-CCNC: 21 U/L (ref 15–41)
BACTERIA UR QL AUTO: NEGATIVE /HPF
BASOPHILS # BLD: 0 K/UL (ref 0–0.2)
BASOPHILS NFR BLD: 1 %
BILIRUB SERPL-MCNC: 0.6 MG/DL (ref 0.3–1.2)
BILIRUB UR QL: NEGATIVE
BUN SERPL-MCNC: 9 MG/DL (ref 8–20)
BUN/CREAT SERPL: 12.9 (ref 10–20)
CALCIUM SERPL-MCNC: 9.3 MG/DL (ref 8.5–10.5)
CHLORIDE SERPL-SCNC: 101 MMOL/L (ref 95–110)
CHOLEST SERPL-MCNC: 169 MG/DL (ref 110–200)
CLARITY UR: CLEAR
CO2 SERPL-SCNC: 29 MMOL/L (ref 22–32)
COLOR UR: YELLOW
CREAT SERPL-MCNC: 0.7 MG/DL (ref 0.5–1.5)
EOSINOPHIL # BLD: 0.1 K/UL (ref 0–0.7)
EOSINOPHIL NFR BLD: 1 %
ERYTHROCYTE [DISTWIDTH] IN BLOOD BY AUTOMATED COUNT: 12.7 % (ref 11–15)
GLOBULIN PLAS-MCNC: 2.4 G/DL (ref 2.5–3.7)
GLUCOSE SERPL-MCNC: 92 MG/DL (ref 70–99)
GLUCOSE UR-MCNC: NEGATIVE MG/DL
HCT VFR BLD AUTO: 41.9 % (ref 35–48)
HDLC SERPL-MCNC: 63 MG/DL
HGB BLD-MCNC: 14.2 G/DL (ref 12–16)
HGB UR QL STRIP.AUTO: NEGATIVE
KETONES UR-MCNC: NEGATIVE MG/DL
LDLC SERPL CALC-MCNC: 89 MG/DL (ref 0–99)
LEUKOCYTE ESTERASE UR QL STRIP.AUTO: NEGATIVE
LYMPHOCYTES # BLD: 0.9 K/UL (ref 1–4)
LYMPHOCYTES NFR BLD: 19 %
MCH RBC QN AUTO: 31 PG (ref 27–32)
MCHC RBC AUTO-ENTMCNC: 33.9 G/DL (ref 32–37)
MCV RBC AUTO: 91.4 FL (ref 80–100)
MONOCYTES # BLD: 0.3 K/UL (ref 0–1)
MONOCYTES NFR BLD: 7 %
NEUTROPHILS # BLD AUTO: 3.4 K/UL (ref 1.8–7.7)
NEUTROPHILS NFR BLD: 72 %
NITRITE UR QL STRIP.AUTO: NEGATIVE
NONHDLC SERPL-MCNC: 106 MG/DL
OSMOLALITY UR CALC.SUM OF ELEC: 282 MOSM/KG (ref 275–295)
PATIENT FASTING: YES
PH UR: 6 [PH] (ref 5–8)
PLATELET # BLD AUTO: 169 K/UL (ref 140–400)
PMV BLD AUTO: 10 FL (ref 7.4–10.3)
POTASSIUM SERPL-SCNC: 4.2 MMOL/L (ref 3.3–5.1)
PROT SERPL-MCNC: 6.6 G/DL (ref 5.9–8.4)
PROT UR-MCNC: NEGATIVE MG/DL
RBC # BLD AUTO: 4.58 M/UL (ref 3.7–5.4)
RBC #/AREA URNS AUTO: 0 /HPF
SODIUM SERPL-SCNC: 137 MMOL/L (ref 136–144)
SP GR UR STRIP: 1.01 (ref 1–1.03)
TRIGL SERPL-MCNC: 86 MG/DL (ref 1–149)
TSH SERPL-ACNC: 0.66 UIU/ML (ref 0.45–5.33)
UROBILINOGEN UR STRIP-ACNC: <2
VIT C UR-MCNC: 40 MG/DL
WBC # BLD AUTO: 4.7 K/UL (ref 4–11)
WBC #/AREA URNS AUTO: <1 /HPF

## 2018-09-05 PROCEDURE — G0463 HOSPITAL OUTPT CLINIC VISIT: HCPCS | Performed by: INTERNAL MEDICINE

## 2018-09-05 PROCEDURE — 80053 COMPREHEN METABOLIC PANEL: CPT

## 2018-09-05 PROCEDURE — 36415 COLL VENOUS BLD VENIPUNCTURE: CPT

## 2018-09-05 PROCEDURE — 84443 ASSAY THYROID STIM HORMONE: CPT

## 2018-09-05 PROCEDURE — 96160 PT-FOCUSED HLTH RISK ASSMT: CPT | Performed by: INTERNAL MEDICINE

## 2018-09-05 PROCEDURE — 85025 COMPLETE CBC W/AUTO DIFF WBC: CPT

## 2018-09-05 PROCEDURE — G0439 PPPS, SUBSEQ VISIT: HCPCS | Performed by: INTERNAL MEDICINE

## 2018-09-05 PROCEDURE — 81003 URINALYSIS AUTO W/O SCOPE: CPT | Performed by: INTERNAL MEDICINE

## 2018-09-05 PROCEDURE — 80061 LIPID PANEL: CPT

## 2018-09-05 NOTE — PROGRESS NOTES
Sanjuana Puckett is a 75year old female who presents for     Medicare annual wellness-medicare advantage      L wrist laceration of 4/2018 immed care visit healed. Arthritis L knee.  Now getting gel shot series from Dr. Melissa Nguyen.      Migraines: Lashay Julianna Family History   Problem Relation Age of Onset   • Breast Cancer Mother 76   • Lipids Mother    • Lung Disorder Mother    • COPD [OTHER] Mother       age 80   •  unknown cause age 61 [OTHER] Father       Social History:   Smoking status: Never Sm wellness.     Hypercholesterolemia-on Lipitor 10 mg daily. LDL 86 on 8/30/17-down from 173 in 8/2016 before tx. Check lipid panel.     Constipation chronic--Miralax prn per Dr. Charlie Copeland. Also takes colace prn.      Migraines hx- zomig 2.5 mg bid prn.  (original Oral Cap Take 1 capsule (100 mg total) by mouth daily as needed for constipation. Disp: 1 capsule Rfl: 0   Multiple Vitamin (MULTI-VITAMINS) Oral Tab Take 1 tablet by mouth daily. Disp:  Rfl:    aspirin 81 MG Oral Tab Take 1 tablet by mouth daily.  Disp:  R SCORE: 0        Advance Directives     Do you have a healthcare power of ?: Yes    Do you have a living will?: Yes     Hearing Assessment (Required for AWV/SWV)      Hearing Screening    Screening Method:  Whisper Test  Whisper Test Result:  Pass preventive care reminders to display for this patient. Update Health Maintenance if applicable    Chlamydia  Annually if high risk No results found for: CHLAMYDIA No flowsheet data found.     Screening Mammogram      Mammogram  Annually Mammogram due on 09/ flowsheet data found.

## 2018-09-06 ENCOUNTER — TELEPHONE (OUTPATIENT)
Dept: INTERNAL MEDICINE CLINIC | Facility: CLINIC | Age: 73
End: 2018-09-06

## 2018-09-06 RX ORDER — ATORVASTATIN CALCIUM 10 MG/1
TABLET, FILM COATED ORAL
Qty: 90 TABLET | Refills: 3 | Status: SHIPPED | OUTPATIENT
Start: 2018-09-06 | End: 2019-09-16

## 2018-09-06 NOTE — TELEPHONE ENCOUNTER
To nursing, please tell pt lab done on 9/5/18--cbc cmp tsh lipid ua--results are ok. I sent a refill for atorvastatin 10 mg daily #90 w 3 refills to [facility].   Thanks.

## 2018-09-12 ENCOUNTER — TELEPHONE (OUTPATIENT)
Dept: INTERNAL MEDICINE CLINIC | Facility: CLINIC | Age: 73
End: 2018-09-12

## 2018-09-12 ENCOUNTER — APPOINTMENT (OUTPATIENT)
Dept: LAB | Age: 73
End: 2018-09-12
Attending: INTERNAL MEDICINE
Payer: MEDICARE

## 2018-09-12 DIAGNOSIS — Z12.11 COLON CANCER SCREENING: ICD-10-CM

## 2018-09-12 LAB — HEMOCCULT STL QL: POSITIVE

## 2018-09-12 PROCEDURE — 82274 ASSAY TEST FOR BLOOD FECAL: CPT

## 2018-09-12 NOTE — TELEPHONE ENCOUNTER
To nursing, please tell patient personally the results of the stool test (fecal immunoassay) for occult blood 9/12/18–result is positive. Is important she call Dr. David Eddy about setting up another colonoscopy as her last one was in 2010.   I sent a copy of th

## 2018-09-13 ENCOUNTER — TELEPHONE (OUTPATIENT)
Dept: GASTROENTEROLOGY | Facility: CLINIC | Age: 73
End: 2018-09-13

## 2018-09-13 NOTE — TELEPHONE ENCOUNTER
Pt has blood in stool and is requesting to see EBS sooner than first available. Offered Ana and other physicians pt declined.  Please call thank you [phone]

## 2018-09-13 NOTE — TELEPHONE ENCOUNTER
Spoke with pt. Seen by PCP and hemoccult came back positive. Denies painful BM's or visible blood in stool. Hx of constipation, has BM's q 2-3 days. Not currently taking colace or OTC for constipation. Drinks prune juice.   Reports last CLN 8 years ago

## 2018-09-13 NOTE — TELEPHONE ENCOUNTER
Nurse called and spoke to patient and relayed message from MD.    Patient verbalized understanding and will f/u with Dr. Lynn Gracia as soon as possible.

## 2018-09-14 NOTE — TELEPHONE ENCOUNTER
Dr Kelli Millan,    Please advise on an alternate date for appt.  Per Irma York, Thursday is not available

## 2018-09-14 NOTE — TELEPHONE ENCOUNTER
She has heme positive stool.   Recent CBC is normal.  Please reassure patient as this is not an urgent situation, however I can see on September 27, Thursday at noon

## 2018-09-17 NOTE — TELEPHONE ENCOUNTER
I spoke to the pt she accepted the appt.  Time date and location verified    Future Appointments   Date Time Provider Grady Ross   10/1/2018  9:20 AM [facility]   10/12/2018 10:30 AM Pita Chavarria MD [facility]

## 2018-10-01 ENCOUNTER — HOSPITAL ENCOUNTER (OUTPATIENT)
Dept: MAMMOGRAPHY | Facility: HOSPITAL | Age: 73
Discharge: HOME | End: 2018-10-01
Attending: INTERNAL MEDICINE
Payer: MEDICARE

## 2018-10-01 DIAGNOSIS — Z12.31 VISIT FOR SCREENING MAMMOGRAM: ICD-10-CM

## 2018-10-01 PROCEDURE — 77067 SCR MAMMO BI INCL CAD: CPT | Performed by: INTERNAL MEDICINE

## 2018-10-01 PROCEDURE — 77063 BREAST TOMOSYNTHESIS BI: CPT | Performed by: INTERNAL MEDICINE

## 2018-10-12 ENCOUNTER — OFFICE VISIT (OUTPATIENT)
Dept: GASTROENTEROLOGY | Facility: CLINIC | Age: 73
End: 2018-10-12
Payer: MEDICARE

## 2018-10-12 VITALS
SYSTOLIC BLOOD PRESSURE: 119 MMHG | HEIGHT: 62 IN | WEIGHT: 99 LBS | BODY MASS INDEX: 18.22 KG/M2 | DIASTOLIC BLOOD PRESSURE: 71 MMHG | HEART RATE: 67 BPM

## 2018-10-12 DIAGNOSIS — K59.09 CHRONIC CONSTIPATION: ICD-10-CM

## 2018-10-12 DIAGNOSIS — R19.5 HEME POSITIVE STOOL: Primary | ICD-10-CM

## 2018-10-12 DIAGNOSIS — Z01.818 PREOPERATIVE CLEARANCE: ICD-10-CM

## 2018-10-12 PROCEDURE — 99214 OFFICE O/P EST MOD 30 MIN: CPT | Performed by: INTERNAL MEDICINE

## 2018-10-12 PROCEDURE — G0463 HOSPITAL OUTPT CLINIC VISIT: HCPCS | Performed by: INTERNAL MEDICINE

## 2018-10-12 RX ORDER — POLYETHYLENE GLYCOL 3350, SODIUM CHLORIDE, SODIUM BICARBONATE, POTASSIUM CHLORIDE 420; 11.2; 5.72; 1.48 G/4L; G/4L; G/4L; G/4L
4 POWDER, FOR SOLUTION ORAL
Qty: 1 BOTTLE | Refills: 0 | Status: SHIPPED | OUTPATIENT
Start: 2018-10-12 | End: 2018-10-12

## 2018-10-12 NOTE — PATIENT INSTRUCTIONS
1.  Schedule colonoscopy with split dose TriLyte preparation and MAC at [facility] or IV sedation at [facility]    2. Continue Colace, prunes as needed for constipation.

## 2018-10-12 NOTE — PROGRESS NOTES
HPI:    Patient ID: Chato Blount is a 75year old female. History of present illness: This is a 69-year-old female patient of Dr. Jeremy Bryant who I last saw in 2015. The patient is status post sigmoid colectomy in 2013.  she has had laparoscopic s preparation. The patient is ASA class II and is a good candidate for IV conscious sedation or MAC anesthesia.     Colonoscopy consent: I have discussed the risks, benefits, and alternatives to colonoscopy with the patient (who demonstrated understanding), headaches. Hematological: Negative for adenopathy. Does not bruise/bleed easily. Psychiatric/Behavioral: Negative for agitation and behavioral problems. All other systems reviewed and are negative.            Current Outpatient Medications:  atorvasta fluid wave, no ascites and no mass. There is no hepatosplenomegaly. There is no tenderness. There is no CVA tenderness. No hernia. Abdomen flat, soft, nondistended, nontender. Well-healed scars post laparoscopic sigmoidectomy.   No mass or hepatosplenome

## 2018-10-19 ENCOUNTER — TELEPHONE (OUTPATIENT)
Dept: GASTROENTEROLOGY | Facility: CLINIC | Age: 73
End: 2018-10-19

## 2018-10-19 DIAGNOSIS — R19.5 HEME POSITIVE STOOL: Primary | ICD-10-CM

## 2018-10-19 DIAGNOSIS — K59.09 CHRONIC CONSTIPATION: ICD-10-CM

## 2018-10-19 NOTE — TELEPHONE ENCOUNTER
Late entry.     Scheduled for:  Colonoscopy 66901  Provider Name: Dr. Willow Martinez  Date:  10/25/18  Location:  [facility]  Sedation:  IV  Time:  10:30 am, arrival 9:30 am  Prep: Trilyte  Meds/Allergies Reconciled?:  Physician reviewed  Diagnosis with codes:  Heme positive

## 2018-10-25 ENCOUNTER — HOSPITAL ENCOUNTER (OUTPATIENT)
Facility: HOSPITAL | Age: 73
Setting detail: HOSPITAL OUTPATIENT SURGERY
Discharge: HOME | End: 2018-10-25
Attending: INTERNAL MEDICINE | Admitting: INTERNAL MEDICINE
Payer: MEDICARE

## 2018-10-25 ENCOUNTER — PATIENT MESSAGE (OUTPATIENT)
Dept: GASTROENTEROLOGY | Facility: CLINIC | Age: 73
End: 2018-10-25

## 2018-10-25 VITALS
WEIGHT: 95 LBS | BODY MASS INDEX: 17.48 KG/M2 | HEART RATE: 78 BPM | OXYGEN SATURATION: 98 % | HEIGHT: 62 IN | SYSTOLIC BLOOD PRESSURE: 121 MMHG | RESPIRATION RATE: 18 BRPM | DIASTOLIC BLOOD PRESSURE: 78 MMHG

## 2018-10-25 DIAGNOSIS — K59.09 CHRONIC CONSTIPATION: ICD-10-CM

## 2018-10-25 DIAGNOSIS — R19.5 HEME POSITIVE STOOL: ICD-10-CM

## 2018-10-25 PROBLEM — Z90.49 S/P LEFT HEMICOLECTOMY: Status: ACTIVE | Noted: 2018-10-25

## 2018-10-25 PROBLEM — K63.9 NODULE OF COLON: Status: ACTIVE | Noted: 2018-10-25

## 2018-10-25 PROBLEM — K64.8 INTERNAL HEMORRHOIDS WITHOUT COMPLICATION: Status: ACTIVE | Noted: 2018-10-25

## 2018-10-25 PROBLEM — Z98.890 S/P COLONOSCOPIC POLYPECTOMY: Status: ACTIVE | Noted: 2018-10-25

## 2018-10-25 PROBLEM — K57.30 DIVERTICULOSIS LARGE INTESTINE W/O PERFORATION OR ABSCESS W/O BLEEDING: Status: ACTIVE | Noted: 2018-10-25

## 2018-10-25 PROCEDURE — G0500 MOD SEDAT ENDO SERVICE >5YRS: HCPCS | Performed by: INTERNAL MEDICINE

## 2018-10-25 PROCEDURE — 0DBN8ZX EXCISION OF SIGMOID COLON, VIA NATURAL OR ARTIFICIAL OPENING ENDOSCOPIC, DIAGNOSTIC: ICD-10-PCS | Performed by: INTERNAL MEDICINE

## 2018-10-25 PROCEDURE — 45380 COLONOSCOPY AND BIOPSY: CPT | Performed by: INTERNAL MEDICINE

## 2018-10-25 PROCEDURE — 0DBK8ZX EXCISION OF ASCENDING COLON, VIA NATURAL OR ARTIFICIAL OPENING ENDOSCOPIC, DIAGNOSTIC: ICD-10-PCS | Performed by: INTERNAL MEDICINE

## 2018-10-25 RX ORDER — SODIUM CHLORIDE, SODIUM LACTATE, POTASSIUM CHLORIDE, CALCIUM CHLORIDE 600; 310; 30; 20 MG/100ML; MG/100ML; MG/100ML; MG/100ML
INJECTION, SOLUTION INTRAVENOUS CONTINUOUS
Status: DISCONTINUED | OUTPATIENT
Start: 2018-10-25 | End: 2018-10-25

## 2018-10-25 RX ORDER — MIDAZOLAM HYDROCHLORIDE 1 MG/ML
INJECTION INTRAMUSCULAR; INTRAVENOUS
Status: DISCONTINUED | OUTPATIENT
Start: 2018-10-25 | End: 2018-10-25

## 2018-10-25 RX ORDER — MIDAZOLAM HYDROCHLORIDE 1 MG/ML
1 INJECTION INTRAMUSCULAR; INTRAVENOUS EVERY 5 MIN PRN
Status: DISCONTINUED | OUTPATIENT
Start: 2018-10-25 | End: 2018-10-25

## 2018-10-25 RX ORDER — SODIUM CHLORIDE 0.9 % (FLUSH) 0.9 %
10 SYRINGE (ML) INJECTION AS NEEDED
Status: DISCONTINUED | OUTPATIENT
Start: 2018-10-25 | End: 2018-10-25

## 2018-10-25 NOTE — H&P
History & Physical Examination    Patient Name: Jorge Hobbs  MRN: [id number]  [facility]: [id number]  YOB: 1945    Diagnosis: Heme positive school      Medications Prior to Admission:  atorvastatin 10 MG Oral Tab TAKE ONE TABLET BY MOUTH NIGHTL hosp. 8/2012 for sigmoid divertiulitis with phlegmon   • Pulsatile tinnitus 01/2017    Left ear eval by Dr. Sabrina Kwon. • Sigmoid diverticulitis 2012 and 2013    recurrent; hosp.  2012 -sigmoid resection 2013     Past Surgical History:   Procedure Laterality

## 2018-10-25 NOTE — BRIEF OP NOTE
Pre-Operative Diagnosis: Heme positive stool     Post-Operative Diagnosis: Status post left hemicolectomy, nodularity colorectal anastomosis, diverticulosis pancolonic,s/p polypectomy x 1,  internal hemorrhoids  Procedure Performed:   Procedure(s):  Corinne Limb

## 2018-10-25 NOTE — OPERATIVE REPORT
[facility]    PATIENT'S NAME: Mick Brendaesther   ATTENDING PHYSICIAN: Kalee Amor MD   OPERATING PHYSICIAN: Kalee Amor MD   PATIENT ACCOUNT#:   [id number]    LOCATION:  [facility] ROOM 13 [address] without inflammatory change. In the mid ascending colon, there was a very small 1 mm polyp which was photographed then removed with cold biopsy forceps. No other polyps were seen.   At 18 cm above the anal verge, an end-to-side colorectal anastomosis was

## 2018-10-26 NOTE — TELEPHONE ENCOUNTER
From: Tashi Space  To: Gregoria Tapia MD  Sent: 10/25/2018 6:55 PM CDT  Subject: Test Results Question    Guess I was a little groggy this a.m. (Thursday October 25), but I am not quite sure what to do about the results of the procedure

## 2018-10-26 NOTE — TELEPHONE ENCOUNTER
I called patientto discuss her results. 1 tubular adenoma, and regenerative changes at colorectal anastamosis.      GI RN, please recall colonoscopy for 5 years

## 2019-09-16 ENCOUNTER — LAB ENCOUNTER (OUTPATIENT)
Dept: LAB | Age: 74
End: 2019-09-16
Attending: INTERNAL MEDICINE
Payer: MEDICARE

## 2019-09-16 ENCOUNTER — TELEPHONE (OUTPATIENT)
Dept: INTERNAL MEDICINE CLINIC | Facility: CLINIC | Age: 74
End: 2019-09-16

## 2019-09-16 ENCOUNTER — OFFICE VISIT (OUTPATIENT)
Dept: INTERNAL MEDICINE CLINIC | Facility: CLINIC | Age: 74
End: 2019-09-16
Payer: MEDICARE

## 2019-09-16 VITALS
OXYGEN SATURATION: 98 % | WEIGHT: 98 LBS | TEMPERATURE: 98 F | SYSTOLIC BLOOD PRESSURE: 110 MMHG | BODY MASS INDEX: 18.03 KG/M2 | RESPIRATION RATE: 14 BRPM | DIASTOLIC BLOOD PRESSURE: 68 MMHG | HEART RATE: 78 BPM | HEIGHT: 62 IN

## 2019-09-16 DIAGNOSIS — E78.00 HYPERCHOLESTEROLEMIA: ICD-10-CM

## 2019-09-16 DIAGNOSIS — I49.3 PVC'S (PREMATURE VENTRICULAR CONTRACTIONS): ICD-10-CM

## 2019-09-16 DIAGNOSIS — Z12.31 VISIT FOR SCREENING MAMMOGRAM: ICD-10-CM

## 2019-09-16 DIAGNOSIS — Z00.00 MEDICARE ANNUAL WELLNESS VISIT, SUBSEQUENT: Primary | ICD-10-CM

## 2019-09-16 DIAGNOSIS — K57.30 DIVERTICULOSIS OF LARGE INTESTINE WITHOUT HEMORRHAGE: ICD-10-CM

## 2019-09-16 DIAGNOSIS — M17.12 PRIMARY OSTEOARTHRITIS OF LEFT KNEE: ICD-10-CM

## 2019-09-16 DIAGNOSIS — H93.A2 PULSATILE TINNITUS OF LEFT EAR: ICD-10-CM

## 2019-09-16 DIAGNOSIS — K59.09 CHRONIC CONSTIPATION: ICD-10-CM

## 2019-09-16 DIAGNOSIS — H90.3 SENSORINEURAL HEARING LOSS (SNHL) OF BOTH EARS: ICD-10-CM

## 2019-09-16 DIAGNOSIS — M85.80 OSTEOPENIA, UNSPECIFIED LOCATION: ICD-10-CM

## 2019-09-16 DIAGNOSIS — Z86.69 HX OF MIGRAINES: ICD-10-CM

## 2019-09-16 LAB
ALBUMIN SERPL-MCNC: 4.1 G/DL (ref 3.4–5)
ALBUMIN/GLOB SERPL: 1.4 {RATIO} (ref 1–2)
ALP LIVER SERPL-CCNC: 91 U/L (ref 55–142)
ALT SERPL-CCNC: 22 U/L (ref 13–56)
ANION GAP SERPL CALC-SCNC: 6 MMOL/L (ref 0–18)
AST SERPL-CCNC: 21 U/L (ref 15–37)
BACTERIA UR QL AUTO: NEGATIVE /HPF
BASOPHILS # BLD AUTO: 0.04 X10(3) UL (ref 0–0.2)
BASOPHILS NFR BLD AUTO: 1 %
BILIRUB SERPL-MCNC: 0.5 MG/DL (ref 0.1–2)
BILIRUB UR QL: NEGATIVE
BUN BLD-MCNC: 13 MG/DL (ref 7–18)
BUN/CREAT SERPL: 16.9 (ref 10–20)
CALCIUM BLD-MCNC: 9.4 MG/DL (ref 8.5–10.1)
CHLORIDE SERPL-SCNC: 107 MMOL/L (ref 98–112)
CHOLEST SMN-MCNC: 154 MG/DL (ref ?–200)
CLARITY UR: CLEAR
CO2 SERPL-SCNC: 31 MMOL/L (ref 21–32)
COLOR UR: YELLOW
CREAT BLD-MCNC: 0.77 MG/DL (ref 0.55–1.02)
DEPRECATED RDW RBC AUTO: 40.9 FL (ref 35.1–46.3)
EOSINOPHIL # BLD AUTO: 0.06 X10(3) UL (ref 0–0.7)
EOSINOPHIL NFR BLD AUTO: 1.5 %
ERYTHROCYTE [DISTWIDTH] IN BLOOD BY AUTOMATED COUNT: 12.3 % (ref 11–15)
GLOBULIN PLAS-MCNC: 3 G/DL (ref 2.8–4.4)
GLUCOSE BLD-MCNC: 92 MG/DL (ref 70–99)
GLUCOSE UR-MCNC: NEGATIVE MG/DL
HCT VFR BLD AUTO: 42.3 % (ref 35–48)
HDLC SERPL-MCNC: 64 MG/DL (ref 40–59)
HGB BLD-MCNC: 14.1 G/DL (ref 12–16)
HGB UR QL STRIP.AUTO: NEGATIVE
IMM GRANULOCYTES # BLD AUTO: 0.01 X10(3) UL (ref 0–1)
IMM GRANULOCYTES NFR BLD: 0.2 %
LDLC SERPL CALC-MCNC: 87 MG/DL (ref ?–100)
LEUKOCYTE ESTERASE UR QL STRIP.AUTO: NEGATIVE
LYMPHOCYTES # BLD AUTO: 1 X10(3) UL (ref 1–4)
LYMPHOCYTES NFR BLD AUTO: 24.2 %
M PROTEIN MFR SERPL ELPH: 7.1 G/DL (ref 6.4–8.2)
MCH RBC QN AUTO: 30.3 PG (ref 26–34)
MCHC RBC AUTO-ENTMCNC: 33.3 G/DL (ref 31–37)
MCV RBC AUTO: 91 FL (ref 80–100)
MONOCYTES # BLD AUTO: 0.34 X10(3) UL (ref 0.1–1)
MONOCYTES NFR BLD AUTO: 8.2 %
NEUTROPHILS # BLD AUTO: 2.68 X10 (3) UL (ref 1.5–7.7)
NEUTROPHILS # BLD AUTO: 2.68 X10(3) UL (ref 1.5–7.7)
NEUTROPHILS NFR BLD AUTO: 64.9 %
NITRITE UR QL STRIP.AUTO: NEGATIVE
NONHDLC SERPL-MCNC: 90 MG/DL (ref ?–130)
OSMOLALITY SERPL CALC.SUM OF ELEC: 298 MOSM/KG (ref 275–295)
PATIENT FASTING: YES
PATIENT FASTING: YES
PH UR: 6 [PH] (ref 5–8)
PLATELET # BLD AUTO: 205 10(3)UL (ref 150–450)
POTASSIUM SERPL-SCNC: 4.4 MMOL/L (ref 3.5–5.1)
PROT UR-MCNC: NEGATIVE MG/DL
RBC # BLD AUTO: 4.65 X10(6)UL (ref 3.8–5.3)
RBC #/AREA URNS AUTO: <1 /HPF
SODIUM SERPL-SCNC: 144 MMOL/L (ref 136–145)
SP GR UR STRIP: 1.02 (ref 1–1.03)
TRIGL SERPL-MCNC: 16 MG/DL (ref 30–149)
TSI SER-ACNC: 0.38 MIU/ML (ref 0.36–3.74)
UROBILINOGEN UR STRIP-ACNC: <2
VIT C UR-MCNC: 40 MG/DL
VLDLC SERPL CALC-MCNC: 3 MG/DL (ref 0–30)
WBC # BLD AUTO: 4.1 X10(3) UL (ref 4–11)
WBC #/AREA URNS AUTO: 1 /HPF

## 2019-09-16 PROCEDURE — G0439 PPPS, SUBSEQ VISIT: HCPCS | Performed by: INTERNAL MEDICINE

## 2019-09-16 PROCEDURE — 80061 LIPID PANEL: CPT

## 2019-09-16 PROCEDURE — 81001 URINALYSIS AUTO W/SCOPE: CPT | Performed by: INTERNAL MEDICINE

## 2019-09-16 PROCEDURE — 96160 PT-FOCUSED HLTH RISK ASSMT: CPT | Performed by: INTERNAL MEDICINE

## 2019-09-16 PROCEDURE — 85025 COMPLETE CBC W/AUTO DIFF WBC: CPT

## 2019-09-16 PROCEDURE — 80053 COMPREHEN METABOLIC PANEL: CPT

## 2019-09-16 PROCEDURE — 84443 ASSAY THYROID STIM HORMONE: CPT

## 2019-09-16 PROCEDURE — 36415 COLL VENOUS BLD VENIPUNCTURE: CPT

## 2019-09-16 PROCEDURE — 93000 ELECTROCARDIOGRAM COMPLETE: CPT | Performed by: INTERNAL MEDICINE

## 2019-09-16 PROCEDURE — 99397 PER PM REEVAL EST PAT 65+ YR: CPT | Performed by: INTERNAL MEDICINE

## 2019-09-16 RX ORDER — ATORVASTATIN CALCIUM 10 MG/1
TABLET, FILM COATED ORAL
Qty: 90 TABLET | Refills: 3 | Status: SHIPPED | OUTPATIENT
Start: 2019-09-16 | End: 2019-09-16

## 2019-09-16 RX ORDER — ATORVASTATIN CALCIUM 10 MG/1
10 TABLET, FILM COATED ORAL EVERY OTHER DAY
Qty: 90 TABLET | Refills: 3 | COMMUNITY
Start: 2019-09-16 | End: 2020-12-16

## 2019-09-16 NOTE — PROGRESS NOTES
Hilda Whitfield is a 75year old female who presents for     Medicare annual wellness    Feels good except arthritis.      Arthritis L knee. Had gel shot series from Dr. Debora Amador 2018, not helpful. Dwight shot helped.    Uses aspercream or ibuprofen for donaldson Mike   • COLON SURGERY  4/2013    laparoscopic sigmoid resection-Dr Del Rosario for diverticulitis   • COLONOSCOPY      colonoscopy Dr. Ania Magana    • COLONOSCOPY N/A 10/25/2018    Performed by Tiesha Virgen MD at [facility] kg)  10/04/17 : 100 lb 6.4 oz (45.5 kg)      General: appears well nourished and in no acute distress  Neck: no adenopathy or thyroid abnormality, no carotid bruits.    Lungs: clear to auscultation  Heart: regular rhythm with occas ectopic, S1S2 normal with understanding of above issues and plan. - URINALYSIS WITH CULTURE REFLEX  - ASSAY, THYROID STIM HORMONE; Future  - LIPID PANEL; Future  - COMP METABOLIC PANEL (14); Future  - CBC WITH DIFFERENTIAL WITH PLATELET;  Future    - JESENIA NAV 2D+3D SCREENING BILA help         Hearing Problems?: No     Functional Status     Hearing Problems?: No    Vision Problems? : No    Difficulty walking?: No    Difficulty dressing or bathing?: No    Problems with daily activities? : No    Memory Problems?: No      Fall/Risk Ass well and fear I will reply improperly:  No   Family members and friends have told me they think I may have hearing loss:  No           Visual Acuity     Right Eye Visual Acuity: Uncorrected Left Eye Visual Acuity: Corrected   Right Eye Chart Acuity: 20/20 found.    Screening Mammogram      Mammogram  Annually There are no preventive care reminders to display for this patient. Update Health Maintenance if applicable   Immunizations      Influenza No orders found for this or any previous visit.  Update Immuniz

## 2019-09-16 NOTE — TELEPHONE ENCOUNTER
Called patient and relayed DR. GASTON message - verbalized understanding- number for scheduling given

## 2019-09-16 NOTE — TELEPHONE ENCOUNTER
To nursing, please tell patient lab done on 9/16/19–CBC CMP TSH lipid UA–results okay except her triglycerides are low at 16 (normal range ). I recommend she reduce the Lipitor (atorvastatin) dose to 10 mg every other day. Thanks.     Note to self–

## 2019-09-16 NOTE — TELEPHONE ENCOUNTER
Called and Relayed MD's message to patient---verbalized understanding  She repeated back to take Lipitor qod.

## 2019-09-16 NOTE — TELEPHONE ENCOUNTER
Please go over immunization with patient - shingles vaccine there but different dates  And which pneumonia 23 or prevnar 13 -see immunization list and also the ones that need to be reconciled when patient calls back

## 2019-09-16 NOTE — TELEPHONE ENCOUNTER
Pt stopped in office, rec'd dates of vaccines for her chart  Shingles vaccine: 1/17/19 and 5/16/19    Prevnar Pneumonia  9/15  2nd 8/3/17    Tasked to nursing

## 2019-09-16 NOTE — TELEPHONE ENCOUNTER
I left message on voicemail for patient to call back. To nursing, when patient calls back please tell her her EKG done at the office visit today shows frequent premature beats called PVCs.     This could be of no significance–which it normally is–but we

## 2019-09-17 ENCOUNTER — PATIENT MESSAGE (OUTPATIENT)
Dept: INTERNAL MEDICINE CLINIC | Facility: CLINIC | Age: 74
End: 2019-09-17

## 2019-09-18 ENCOUNTER — TELEPHONE (OUTPATIENT)
Dept: INTERNAL MEDICINE CLINIC | Facility: CLINIC | Age: 74
End: 2019-09-18

## 2019-09-18 NOTE — TELEPHONE ENCOUNTER
----- Message from Jose Roberto Mooney sent at 9/17/2019  6:24 PM CDT -----  Regarding: Non-Urgent Medical Question  Contact: [phone]  Under a test result that is called \"Assay, Thyroid Stim Hormone\". ..there is a note saying that this test could be a

## 2019-09-18 NOTE — TELEPHONE ENCOUNTER
I spoke with patient and explained that her TSH was normal and explained that the note regarding biotin is not specific to her. She verbalized understanding. She says she takes a MVI and I told her this was ok. She verbalized understanding.      Patient ask

## 2019-10-04 ENCOUNTER — HOSPITAL ENCOUNTER (OUTPATIENT)
Dept: MAMMOGRAPHY | Facility: HOSPITAL | Age: 74
Discharge: HOME | End: 2019-10-04
Attending: INTERNAL MEDICINE
Payer: MEDICARE

## 2019-10-04 ENCOUNTER — HOSPITAL ENCOUNTER (OUTPATIENT)
Dept: CV DIAGNOSTICS | Facility: HOSPITAL | Age: 74
Discharge: HOME | End: 2019-10-04
Attending: INTERNAL MEDICINE
Payer: MEDICARE

## 2019-10-04 DIAGNOSIS — Z12.31 VISIT FOR SCREENING MAMMOGRAM: ICD-10-CM

## 2019-10-04 DIAGNOSIS — I49.3 PVC'S (PREMATURE VENTRICULAR CONTRACTIONS): ICD-10-CM

## 2019-10-04 PROCEDURE — 93306 TTE W/DOPPLER COMPLETE: CPT | Performed by: INTERNAL MEDICINE

## 2019-10-04 PROCEDURE — 77067 SCR MAMMO BI INCL CAD: CPT | Performed by: INTERNAL MEDICINE

## 2019-10-04 PROCEDURE — 77063 BREAST TOMOSYNTHESIS BI: CPT | Performed by: INTERNAL MEDICINE

## 2019-10-08 ENCOUNTER — PATIENT MESSAGE (OUTPATIENT)
Dept: INTERNAL MEDICINE CLINIC | Facility: CLINIC | Age: 74
End: 2019-10-08

## 2019-10-08 DIAGNOSIS — I49.3 FREQUENT PVCS: ICD-10-CM

## 2019-10-08 DIAGNOSIS — R94.31 ABNORMAL EKG: Primary | ICD-10-CM

## 2019-10-09 NOTE — TELEPHONE ENCOUNTER
From: Butch Pena  To: Vernon Estrada MD  Sent: 10/8/2019 7:33 PM CDT  Subject: Test Results Question    I recently had two medical tests, a mammogram and an echocardiogram. I pretty much understood the results of the mammogram which seemed to ind

## 2019-10-31 ENCOUNTER — HOSPITAL ENCOUNTER (OUTPATIENT)
Dept: CV DIAGNOSTICS | Facility: HOSPITAL | Age: 74
Discharge: HOME | End: 2019-10-31
Attending: INTERNAL MEDICINE
Payer: MEDICARE

## 2019-10-31 DIAGNOSIS — R94.31 ABNORMAL EKG: ICD-10-CM

## 2019-10-31 DIAGNOSIS — I49.3 FREQUENT PVCS: ICD-10-CM

## 2019-10-31 PROCEDURE — 93016 CV STRESS TEST SUPVJ ONLY: CPT | Performed by: INTERNAL MEDICINE

## 2019-10-31 PROCEDURE — 93350 STRESS TTE ONLY: CPT | Performed by: INTERNAL MEDICINE

## 2019-10-31 PROCEDURE — 93018 CV STRESS TEST I&R ONLY: CPT | Performed by: INTERNAL MEDICINE

## 2019-10-31 PROCEDURE — 93017 CV STRESS TEST TRACING ONLY: CPT | Performed by: INTERNAL MEDICINE

## 2019-11-01 ENCOUNTER — TELEPHONE (OUTPATIENT)
Dept: INTERNAL MEDICINE CLINIC | Facility: CLINIC | Age: 74
End: 2019-11-01

## 2019-11-01 NOTE — TELEPHONE ENCOUNTER
Spoke to patient and relayed MD message, she verbalizes understanding and is very thankful for the call.

## 2020-04-03 ENCOUNTER — PATIENT MESSAGE (OUTPATIENT)
Dept: INTERNAL MEDICINE CLINIC | Facility: CLINIC | Age: 75
End: 2020-04-03

## 2020-04-06 NOTE — TELEPHONE ENCOUNTER
From: Jj Gill  To: Amber Pastrana MD  Sent: 4/3/2020 5:01 PM CDT  Subject: Other    I am concerned about the Covid-19 virus and the government recommendation that we should all be using hand  and now the recommendation is that we all w

## 2020-04-27 ENCOUNTER — PATIENT MESSAGE (OUTPATIENT)
Dept: INTERNAL MEDICINE CLINIC | Facility: CLINIC | Age: 75
End: 2020-04-27

## 2020-04-27 NOTE — TELEPHONE ENCOUNTER
Spoke to patient  She has a flip phone and does not have MyChart access or face time on her phone    Please advise [phone]

## 2020-04-27 NOTE — TELEPHONE ENCOUNTER
From: Wendy Lynn  To: Dayna Kent MD  Sent: 4/27/2020 4:37 PM CDT  Subject: Non-Urgent Medical Question    I have had a rough, scaly, red spot on my leg since mid-March (today is April 27).  It is round and roughly the size of a pencil eraser--a

## 2020-04-27 NOTE — TELEPHONE ENCOUNTER
From: Rachana Parmar  To: Wilber Hauser MD  Sent: 4/27/2020 4:40 PM CDT  Subject: Non-Urgent Medical Question    Forgive me. Kartik Parra I just sent a message regarding a red, scaly patch on my leg, but failed to include my email address.     ashley hernandez

## 2020-04-27 NOTE — TELEPHONE ENCOUNTER
To Dr. Severo Pastures----    Additionally received:  \"Forgive me. Emir Pope I just sent a message regarding a red, scaly patch on my leg, but failed to include my email address. [email or web address]   [phone] \"

## 2020-04-28 ENCOUNTER — TELEPHONE (OUTPATIENT)
Dept: INTERNAL MEDICINE CLINIC | Facility: CLINIC | Age: 75
End: 2020-04-28

## 2020-04-28 DIAGNOSIS — L98.9 SKIN LESION OF RIGHT LEG: Primary | ICD-10-CM

## 2020-04-28 PROCEDURE — 99441 PHONE E/M BY PHYS 5-10 MIN: CPT | Performed by: INTERNAL MEDICINE

## 2020-04-28 NOTE — TELEPHONE ENCOUNTER
Virtual Telephone Check-In    Hilda Whitfield verbally consents to a Virtual/Telephone Check-In visit on 04/28/20. Patient understands and accepts financial responsibility for any deductible, co-insurance and/or co-pays associated with this service. visit as no physical exam could be performed. Every conscious effort was taken to allow for sufficient and adequate time.     Current Outpatient Medications   Medication Sig Dispense Refill   • triamcinolone acetonide 0.1 % External Cream Apply topically 2

## 2020-04-28 NOTE — TELEPHONE ENCOUNTER
Pt. Called back stating she is not comfortable coming into the office at this time. She cancelled her appt. For tomorrow. She stated she will call Dr. Ana Munoz in about 2 weeks.

## 2020-05-06 ENCOUNTER — PATIENT MESSAGE (OUTPATIENT)
Dept: INTERNAL MEDICINE CLINIC | Facility: CLINIC | Age: 75
End: 2020-05-06

## 2020-05-07 NOTE — TELEPHONE ENCOUNTER
From: James Barrientos  To: Lona Leslie MD  Sent: 5/6/2020 6:40 PM CDT  Subject: Non-Urgent Medical Question    Just a brief note to tell you that the red area that I had on my leg since mid-March seems to be healing nicely, thanks to the application

## 2020-09-18 ENCOUNTER — LAB ENCOUNTER (OUTPATIENT)
Dept: LAB | Age: 75
End: 2020-09-18
Attending: INTERNAL MEDICINE
Payer: MEDICARE

## 2020-09-18 ENCOUNTER — TELEPHONE (OUTPATIENT)
Dept: INTERNAL MEDICINE CLINIC | Facility: CLINIC | Age: 75
End: 2020-09-18

## 2020-09-18 ENCOUNTER — OFFICE VISIT (OUTPATIENT)
Dept: INTERNAL MEDICINE CLINIC | Facility: CLINIC | Age: 75
End: 2020-09-18
Payer: MEDICARE

## 2020-09-18 VITALS
HEART RATE: 64 BPM | HEIGHT: 62 IN | BODY MASS INDEX: 18.22 KG/M2 | SYSTOLIC BLOOD PRESSURE: 122 MMHG | TEMPERATURE: 97 F | DIASTOLIC BLOOD PRESSURE: 66 MMHG | WEIGHT: 99 LBS

## 2020-09-18 DIAGNOSIS — I49.3 PVC'S (PREMATURE VENTRICULAR CONTRACTIONS): ICD-10-CM

## 2020-09-18 DIAGNOSIS — Z86.69 HX OF MIGRAINES: ICD-10-CM

## 2020-09-18 DIAGNOSIS — E78.00 HYPERCHOLESTEROLEMIA: ICD-10-CM

## 2020-09-18 DIAGNOSIS — M17.12 PRIMARY OSTEOARTHRITIS OF LEFT KNEE: ICD-10-CM

## 2020-09-18 DIAGNOSIS — Z00.00 MEDICARE ANNUAL WELLNESS VISIT, SUBSEQUENT: Primary | ICD-10-CM

## 2020-09-18 DIAGNOSIS — H90.3 SENSORINEURAL HEARING LOSS (SNHL) OF BOTH EARS: ICD-10-CM

## 2020-09-18 DIAGNOSIS — K59.09 CHRONIC CONSTIPATION: ICD-10-CM

## 2020-09-18 DIAGNOSIS — R94.4 DECREASED GFR: Primary | ICD-10-CM

## 2020-09-18 DIAGNOSIS — H93.A2 PULSATILE TINNITUS OF LEFT EAR: ICD-10-CM

## 2020-09-18 DIAGNOSIS — Z12.31 VISIT FOR SCREENING MAMMOGRAM: ICD-10-CM

## 2020-09-18 DIAGNOSIS — K57.30 DIVERTICULOSIS OF COLON: ICD-10-CM

## 2020-09-18 DIAGNOSIS — M85.80 OSTEOPENIA, UNSPECIFIED LOCATION: ICD-10-CM

## 2020-09-18 DIAGNOSIS — Z90.49 S/P LAPAROSCOPIC-ASSISTED SIGMOIDECTOMY: ICD-10-CM

## 2020-09-18 LAB
ALBUMIN SERPL-MCNC: 4 G/DL (ref 3.4–5)
ALBUMIN/GLOB SERPL: 1.3 {RATIO} (ref 1–2)
ALP LIVER SERPL-CCNC: 90 U/L (ref 55–142)
ALT SERPL-CCNC: 19 U/L (ref 13–56)
ANION GAP SERPL CALC-SCNC: 5 MMOL/L (ref 0–18)
AST SERPL-CCNC: 17 U/L (ref 15–37)
BACTERIA UR QL AUTO: NEGATIVE /HPF
BASOPHILS # BLD AUTO: 0.05 X10(3) UL (ref 0–0.2)
BASOPHILS NFR BLD AUTO: 1.1 %
BILIRUB SERPL-MCNC: 0.5 MG/DL (ref 0.1–2)
BILIRUB UR QL: NEGATIVE
BUN BLD-MCNC: 10 MG/DL (ref 7–18)
BUN/CREAT SERPL: 9.8 (ref 10–20)
CALCIUM BLD-MCNC: 9.4 MG/DL (ref 8.5–10.1)
CHLORIDE SERPL-SCNC: 105 MMOL/L (ref 98–112)
CHOLEST SMN-MCNC: 154 MG/DL (ref ?–200)
CLARITY UR: CLEAR
CO2 SERPL-SCNC: 29 MMOL/L (ref 21–32)
COLOR UR: YELLOW
CREAT BLD-MCNC: 1.02 MG/DL (ref 0.55–1.02)
DEPRECATED RDW RBC AUTO: 40.2 FL (ref 35.1–46.3)
EOSINOPHIL # BLD AUTO: 0.05 X10(3) UL (ref 0–0.7)
EOSINOPHIL NFR BLD AUTO: 1.1 %
ERYTHROCYTE [DISTWIDTH] IN BLOOD BY AUTOMATED COUNT: 11.9 % (ref 11–15)
GLOBULIN PLAS-MCNC: 3.1 G/DL (ref 2.8–4.4)
GLUCOSE BLD-MCNC: 85 MG/DL (ref 70–99)
GLUCOSE UR-MCNC: NEGATIVE MG/DL
HCT VFR BLD AUTO: 41.2 % (ref 35–48)
HDLC SERPL-MCNC: 53 MG/DL (ref 40–59)
HGB BLD-MCNC: 13.8 G/DL (ref 12–16)
HGB UR QL STRIP.AUTO: NEGATIVE
IMM GRANULOCYTES # BLD AUTO: 0.01 X10(3) UL (ref 0–1)
IMM GRANULOCYTES NFR BLD: 0.2 %
KETONES UR-MCNC: NEGATIVE MG/DL
LDLC SERPL CALC-MCNC: 85 MG/DL (ref ?–100)
LYMPHOCYTES # BLD AUTO: 1.06 X10(3) UL (ref 1–4)
LYMPHOCYTES NFR BLD AUTO: 23.6 %
M PROTEIN MFR SERPL ELPH: 7.1 G/DL (ref 6.4–8.2)
MCH RBC QN AUTO: 30.9 PG (ref 26–34)
MCHC RBC AUTO-ENTMCNC: 33.5 G/DL (ref 31–37)
MCV RBC AUTO: 92.2 FL (ref 80–100)
MONOCYTES # BLD AUTO: 0.38 X10(3) UL (ref 0.1–1)
MONOCYTES NFR BLD AUTO: 8.5 %
NEUTROPHILS # BLD AUTO: 2.94 X10 (3) UL (ref 1.5–7.7)
NEUTROPHILS # BLD AUTO: 2.94 X10(3) UL (ref 1.5–7.7)
NEUTROPHILS NFR BLD AUTO: 65.5 %
NITRITE UR QL STRIP.AUTO: NEGATIVE
NONHDLC SERPL-MCNC: 101 MG/DL (ref ?–130)
OSMOLALITY SERPL CALC.SUM OF ELEC: 286 MOSM/KG (ref 275–295)
PATIENT FASTING Y/N/NP: YES
PATIENT FASTING Y/N/NP: YES
PH UR: 7 [PH] (ref 5–8)
PLATELET # BLD AUTO: 191 10(3)UL (ref 150–450)
POTASSIUM SERPL-SCNC: 4.1 MMOL/L (ref 3.5–5.1)
PROT UR-MCNC: NEGATIVE MG/DL
RBC # BLD AUTO: 4.47 X10(6)UL (ref 3.8–5.3)
RBC #/AREA URNS AUTO: <1 /HPF
SODIUM SERPL-SCNC: 139 MMOL/L (ref 136–145)
SP GR UR STRIP: 1.01 (ref 1–1.03)
TRIGL SERPL-MCNC: 82 MG/DL (ref 30–149)
TSI SER-ACNC: 0.72 MIU/ML (ref 0.36–3.74)
UROBILINOGEN UR STRIP-ACNC: <2
VLDLC SERPL CALC-MCNC: 16 MG/DL (ref 0–30)
WBC # BLD AUTO: 4.5 X10(3) UL (ref 4–11)
WBC #/AREA URNS AUTO: 2 /HPF

## 2020-09-18 PROCEDURE — 81001 URINALYSIS AUTO W/SCOPE: CPT | Performed by: INTERNAL MEDICINE

## 2020-09-18 PROCEDURE — 84443 ASSAY THYROID STIM HORMONE: CPT

## 2020-09-18 PROCEDURE — 80061 LIPID PANEL: CPT

## 2020-09-18 PROCEDURE — 3074F SYST BP LT 130 MM HG: CPT | Performed by: INTERNAL MEDICINE

## 2020-09-18 PROCEDURE — 3078F DIAST BP <80 MM HG: CPT | Performed by: INTERNAL MEDICINE

## 2020-09-18 PROCEDURE — 96160 PT-FOCUSED HLTH RISK ASSMT: CPT | Performed by: INTERNAL MEDICINE

## 2020-09-18 PROCEDURE — 99397 PER PM REEVAL EST PAT 65+ YR: CPT | Performed by: INTERNAL MEDICINE

## 2020-09-18 PROCEDURE — G0439 PPPS, SUBSEQ VISIT: HCPCS | Performed by: INTERNAL MEDICINE

## 2020-09-18 PROCEDURE — 3008F BODY MASS INDEX DOCD: CPT | Performed by: INTERNAL MEDICINE

## 2020-09-18 PROCEDURE — 36415 COLL VENOUS BLD VENIPUNCTURE: CPT

## 2020-09-18 PROCEDURE — 80053 COMPREHEN METABOLIC PANEL: CPT

## 2020-09-18 PROCEDURE — 85025 COMPLETE CBC W/AUTO DIFF WBC: CPT

## 2020-09-18 NOTE — PROGRESS NOTES
Ashia Sue is a 67year old female who presents for     Medicare annual wellness    Feels good. Spot of rash on R leg (phone visit of 4/2020) resolved with TAC cream.     Had the \"flu\" in Jan--dry cough, fever and lost sense of smell.  Wonders i resection 2013      Past Surgical History:   Procedure Laterality Date   • CATARACT EXTRACTION Bilateral 2006    Dr Kassy Beal   • COLON SURGERY  4/2013    laparoscopic sigmoid resection-Dr Del Rosario for diverticulitis   • COLONOSCOPY      colonoscopy (43.1 kg)  04/25/18 : 100 lb (45.4 kg)      General: appears well nourished and in no acute distress  Neck: no adenopathy or thyroid abnormality, no carotid bruits.    Lungs: clear to auscultation  Heart: regular rhythm, S1S2 normal without murmur  Breasts: w reflex --today.     Ret in 1 yr with same lab.       Patient expresses understanding of above issues and plan. - URINALYSIS WITH CULTURE REFLEX  - LIPID PANEL; Future  - COMP METABOLIC PANEL (14); Future  - CBC WITH DIFFERENTIAL WITH PLATELET;  Future help    Dressing: (P) Able without help    Eating: (P) Able without help    Driving: (P) Able without help    Preparing your meals: (P) Able without help    Managing money/bills: (P) Able without help    Taking medications as prescribed: (P) Able without h data found. Fasting Blood Sugar (FSB)Annually Glucose (mg/dL)   Date Value   09/16/2019 92    No flowsheet data found.    Cardiovascular Disease Screening     LDL Annually LDL Cholesterol (mg/dL)   Date Value   09/16/2019 87        EKG One Time     Color previous visit.  Update Immunization Activity if applicable     SPECIFIC DISEASE MONITORING Internal Lab or Procedure External Lab or Procedure   Annual Monitoring of Persistent     Medications (ACE/ARB, digoxin, diuretics)    Potassium  Annually Potassium

## 2020-09-18 NOTE — TELEPHONE ENCOUNTER
To nursing, please tell patient lab results are okay except kidney function is slightly decreased from a year ago. This is in no danger zone at all. This may be because she was fasting for her lab.   Repeat just the kidney function level (creatinine)

## 2020-09-21 NOTE — TELEPHONE ENCOUNTER
Patient called and relayed Dr Esteban Mendoza message. Patient verbalized understanding with no further questions noted. Patient transferred to E[facility]  to schedule lab draw in 1 month.

## 2020-09-29 ENCOUNTER — LAB ENCOUNTER (OUTPATIENT)
Dept: LAB | Age: 75
End: 2020-09-29
Attending: INTERNAL MEDICINE
Payer: MEDICARE

## 2020-09-29 ENCOUNTER — TELEPHONE (OUTPATIENT)
Dept: INTERNAL MEDICINE CLINIC | Facility: CLINIC | Age: 75
End: 2020-09-29

## 2020-09-29 DIAGNOSIS — R94.4 DECREASED GFR: ICD-10-CM

## 2020-09-29 PROCEDURE — 82565 ASSAY OF CREATININE: CPT

## 2020-09-29 PROCEDURE — 36415 COLL VENOUS BLD VENIPUNCTURE: CPT

## 2020-09-29 NOTE — TELEPHONE ENCOUNTER
I sent patient results note email:  Aldo Irving, your follow-up creatinine level done today 9/29/20 is normal.  Creatinine 0.83 with GFR 69 shows normal kidney function. Please let us know if questions. Regards, Dr. Jeremy Bryant.       Note to self–  9/29/2

## 2020-11-30 ENCOUNTER — HOSPITAL ENCOUNTER (OUTPATIENT)
Dept: MAMMOGRAPHY | Facility: HOSPITAL | Age: 75
Discharge: HOME | End: 2020-11-30
Attending: INTERNAL MEDICINE
Payer: MEDICARE

## 2020-11-30 ENCOUNTER — TELEPHONE (OUTPATIENT)
Dept: INTERNAL MEDICINE CLINIC | Facility: CLINIC | Age: 75
End: 2020-11-30

## 2020-11-30 DIAGNOSIS — Z12.31 VISIT FOR SCREENING MAMMOGRAM: ICD-10-CM

## 2020-11-30 PROCEDURE — 77063 BREAST TOMOSYNTHESIS BI: CPT | Performed by: INTERNAL MEDICINE

## 2020-11-30 PROCEDURE — 77067 SCR MAMMO BI INCL CAD: CPT | Performed by: INTERNAL MEDICINE

## 2020-11-30 NOTE — TELEPHONE ENCOUNTER
Requests call back with her mammogram results - patient is unable to access results via [employer]      [phone]

## 2020-11-30 NOTE — TELEPHONE ENCOUNTER
To nursing, please tell patient screening mammogram done today 11/30/20–result is negative.   Please tell patient just so she knows for the future that it may take a few days  for the mammogram to be interpreted by the radiologist and results released to my

## 2020-12-16 RX ORDER — ATORVASTATIN CALCIUM 10 MG/1
10 TABLET, FILM COATED ORAL EVERY OTHER DAY
Qty: 45 TABLET | Refills: 3 | Status: SHIPPED | OUTPATIENT
Start: 2020-12-16 | End: 2022-02-01

## 2020-12-16 NOTE — TELEPHONE ENCOUNTER
Anita Huff. Refill request is for a maintenance medication and has met the criteria specified in the Ambulatory Medication Refill Standing Order for eligibility, visits, laboratory, alerts and was sent to the requested pharmacy.     Requested Prescri

## 2020-12-16 NOTE — TELEPHONE ENCOUNTER
LM on patient's home phone (ok per HIPAA) asking to call back to clarify how she is taking. When initially requested from pharmacy sig was to take daily.    Per OV 9/18/20:  \"Hypercholesterolemia-on Lipitor 10 mg QOD (cut from daily in 9/2016 b/c low TG 16

## 2021-01-19 ENCOUNTER — PATIENT MESSAGE (OUTPATIENT)
Dept: INTERNAL MEDICINE CLINIC | Facility: CLINIC | Age: 76
End: 2021-01-19

## 2021-01-19 NOTE — TELEPHONE ENCOUNTER
From: Reda Najjar  To: Fern Corral MD  Sent: 1/19/2021 7:29 AM CST  Subject: Other    I am writing because I am concerned about availability of the Covid vaccine. There is much conflicting information in the press, and so I hate to rely on that.

## 2021-02-02 ENCOUNTER — TELEPHONE (OUTPATIENT)
Dept: INTERNAL MEDICINE CLINIC | Facility: CLINIC | Age: 76
End: 2021-02-02

## 2021-02-02 NOTE — TELEPHONE ENCOUNTER
To Dr. Moe Ambrose to please advise---  Spoke to patient who reports noticing light pink spotting on her underwear yesterday 2/1. Pt noticed this in the afternoon, denies hematuria, urinary urgency, frequency, or dysuria.  Denies fever or chills, denies any abdominal

## 2021-02-02 NOTE — TELEPHONE ENCOUNTER
wicho atient and relayed DR. GASTON message - she will call to schedule isak.  RN instructed patient that if she develops fever, flank pain , blood in  urine that she needs to go to ER - verbalized understanding

## 2021-02-02 NOTE — TELEPHONE ENCOUNTER
Spoke with patient. She reports she is unsure of source. Reports since last week she has noticed on occasion that her urine is cloudy and has a strong odor. Advised appointment with Dr. Dameon Munoz.  Patient reports she cannot come in for OV until Friday,

## 2021-02-02 NOTE — TELEPHONE ENCOUNTER
----- Message from Sami Mackenzie sent at 2/2/2021  8:30 AM CST -----  Regarding: Non-Urgent Medical Question  Contact: [phone]  I noticed some very minor spotting in my underwear yesterday. Happened only once, and nothing since.   Should I be con

## 2021-02-02 NOTE — TELEPHONE ENCOUNTER
To nursing, if she feels it is vaginal source, see her gyn. If she is unsure of source, see me today. Thanks.

## 2021-02-04 NOTE — PROGRESS NOTES
Hilda Whitfield is a 67year old female who presents for     Spotting in underwear  \"A few days ago (3 days ago) I noted some spotting in my underpants. I noticed it too when I wiped (after urinating)--it was pink. \"  \"it was scattered around, it was 1 SURGERY  4/2013    laparoscopic sigmoid resection-Dr Del Rosario for diverticulitis   • COLONOSCOPY      colonoscopy Dr. Kizzy Fuentes    • COLONOSCOPY N/A 10/25/2018    Performed by Kathryn Calle MD at [place] [address] or hepatosplenomegaly  Pelvic--assisted by [facility] med asst   Ext genitalia--no abnormality or bleeding. Vaginal speculum exam--no lesions, discharge or bleeding. Cervix -appeared nl without bleeding, discharge or lesions. Pap taken.    Rectovaginal-n negative. Dexa-2/2015-osteopenia. Neda Sales -Dr. Khai Neil in 3/13--pelvic US neg as f/u to CT abd pelvis ER in 2/13-no uterine abnl.  and Dr. Khai Neil said not need further eval.      lab 9/16/19–CBC CMP TSH lipid UA–results ok except TG low at 16 (normal range

## 2021-02-05 ENCOUNTER — OFFICE VISIT (OUTPATIENT)
Dept: INTERNAL MEDICINE CLINIC | Facility: CLINIC | Age: 76
End: 2021-02-05
Payer: MEDICARE

## 2021-02-05 VITALS
WEIGHT: 99 LBS | TEMPERATURE: 98 F | HEIGHT: 62 IN | DIASTOLIC BLOOD PRESSURE: 66 MMHG | HEART RATE: 68 BPM | BODY MASS INDEX: 18.22 KG/M2 | SYSTOLIC BLOOD PRESSURE: 118 MMHG

## 2021-02-05 DIAGNOSIS — N93.9 VAGINAL BLEEDING: ICD-10-CM

## 2021-02-05 DIAGNOSIS — R58 BLOOD IN UNDERWEAR: Primary | ICD-10-CM

## 2021-02-05 LAB
BILIRUB UR QL: NEGATIVE
CLARITY UR: CLEAR
COLOR UR: YELLOW
GLUCOSE UR-MCNC: NEGATIVE MG/DL
HGB UR QL STRIP.AUTO: NEGATIVE
KETONES UR-MCNC: NEGATIVE MG/DL
LEUKOCYTE ESTERASE UR QL STRIP.AUTO: NEGATIVE
NITRITE UR QL STRIP.AUTO: NEGATIVE
PH UR: 7 [PH] (ref 5–8)
PROT UR-MCNC: NEGATIVE MG/DL
SP GR UR STRIP: 1.01 (ref 1–1.03)
UROBILINOGEN UR STRIP-ACNC: <2

## 2021-02-05 PROCEDURE — 3008F BODY MASS INDEX DOCD: CPT | Performed by: INTERNAL MEDICINE

## 2021-02-05 PROCEDURE — 99215 OFFICE O/P EST HI 40 MIN: CPT | Performed by: INTERNAL MEDICINE

## 2021-02-05 PROCEDURE — 3078F DIAST BP <80 MM HG: CPT | Performed by: INTERNAL MEDICINE

## 2021-02-05 PROCEDURE — 3074F SYST BP LT 130 MM HG: CPT | Performed by: INTERNAL MEDICINE

## 2021-02-05 NOTE — PATIENT INSTRUCTIONS
Pelvic ultrasound--see up on-line or via phone. Pap test of cervix was done today. Urinalysis and urine culture was done today. See your gynecologist Dr Mike Morin.

## 2021-02-07 ENCOUNTER — TELEPHONE (OUTPATIENT)
Dept: INTERNAL MEDICINE CLINIC | Facility: CLINIC | Age: 76
End: 2021-02-07

## 2021-02-08 NOTE — TELEPHONE ENCOUNTER
I sent pt a results note email:  Lo Jones, your urinalysis and urine culture done 2/5/21 is negative. No urinary tract infection found. Go ahead and see your gynecologist as we discussed at your visit. Please let us know if questions. Dr Margarita Salamanca.

## 2021-02-10 ENCOUNTER — HOSPITAL ENCOUNTER (OUTPATIENT)
Dept: ULTRASOUND IMAGING | Facility: HOSPITAL | Age: 76
Discharge: HOME | End: 2021-02-10
Attending: INTERNAL MEDICINE
Payer: MEDICARE

## 2021-02-10 DIAGNOSIS — R58 BLOOD IN UNDERWEAR: ICD-10-CM

## 2021-02-10 DIAGNOSIS — N93.9 VAGINAL BLEEDING: ICD-10-CM

## 2021-02-10 LAB — HPV I/H RISK 1 DNA SPEC QL NAA+PROBE: NEGATIVE

## 2021-02-10 PROCEDURE — 76830 TRANSVAGINAL US NON-OB: CPT | Performed by: INTERNAL MEDICINE

## 2021-02-10 PROCEDURE — 76856 US EXAM PELVIC COMPLETE: CPT | Performed by: INTERNAL MEDICINE

## 2021-02-11 ENCOUNTER — PATIENT MESSAGE (OUTPATIENT)
Dept: INTERNAL MEDICINE CLINIC | Facility: CLINIC | Age: 76
End: 2021-02-11

## 2021-03-26 ENCOUNTER — PATIENT MESSAGE (OUTPATIENT)
Dept: INTERNAL MEDICINE CLINIC | Facility: CLINIC | Age: 76
End: 2021-03-26

## 2021-03-26 NOTE — TELEPHONE ENCOUNTER
From: Yesy Dover  To: Mikki Baer MD  Sent: 3/26/2021 3:09 PM CDT  Subject: Non-Urgent Medical Question    Just an update to my medical file: I am now fully vaccinated against Covid-19.  I received my first Parmar Peter shot on March 5, and my second

## 2021-09-03 ENCOUNTER — HOSPITAL ENCOUNTER (EMERGENCY)
Facility: HOSPITAL | Age: 76
Discharge: HOME | End: 2021-09-03
Attending: EMERGENCY MEDICINE
Payer: MEDICARE

## 2021-09-03 ENCOUNTER — APPOINTMENT (OUTPATIENT)
Dept: GENERAL RADIOLOGY | Facility: HOSPITAL | Age: 76
End: 2021-09-03
Attending: EMERGENCY MEDICINE
Payer: MEDICARE

## 2021-09-03 VITALS
TEMPERATURE: 98 F | RESPIRATION RATE: 18 BRPM | DIASTOLIC BLOOD PRESSURE: 67 MMHG | SYSTOLIC BLOOD PRESSURE: 164 MMHG | OXYGEN SATURATION: 98 % | WEIGHT: 100 LBS | BODY MASS INDEX: 18 KG/M2 | HEART RATE: 70 BPM

## 2021-09-03 DIAGNOSIS — S39.012A STRAIN OF LUMBAR REGION, INITIAL ENCOUNTER: Primary | ICD-10-CM

## 2021-09-03 LAB
BILIRUB UR QL: NEGATIVE
CLARITY UR: CLEAR
COLOR UR: YELLOW
GLUCOSE UR-MCNC: NEGATIVE MG/DL
HGB UR QL STRIP.AUTO: NEGATIVE
KETONES UR-MCNC: NEGATIVE MG/DL
LEUKOCYTE ESTERASE UR QL STRIP.AUTO: NEGATIVE
NITRITE UR QL STRIP.AUTO: NEGATIVE
PH UR: 6 [PH] (ref 5–8)
PROT UR-MCNC: NEGATIVE MG/DL
SP GR UR STRIP: 1.02 (ref 1–1.03)
UROBILINOGEN UR STRIP-ACNC: <2

## 2021-09-03 PROCEDURE — 99283 EMERGENCY DEPT VISIT LOW MDM: CPT

## 2021-09-03 PROCEDURE — 81001 URINALYSIS AUTO W/SCOPE: CPT | Performed by: EMERGENCY MEDICINE

## 2021-09-03 PROCEDURE — 72100 X-RAY EXAM L-S SPINE 2/3 VWS: CPT | Performed by: EMERGENCY MEDICINE

## 2021-09-03 NOTE — ED INITIAL ASSESSMENT (HPI)
Patient complains of r lower back pain/flank pain over the past week, states it waxed when she was moving books yesterday

## 2021-09-03 NOTE — ED PROVIDER NOTES
Patient Seen in: [facility] AND [facility] Emergency Department    History   Patient presents with:  Back Pain    Stated Complaint: right side back pain     HPI    Patient presents to the emergency department with complaint of low back pain.   She was lifting up Akil Jackson. • PVC's (premature ventricular contractions) 2019    echo and stress echo ok in 2019   • S/P colonoscopic polypectomy 10/25/2018   • S/P left hemicolectomy 10/25/2018   • Sigmoid diverticulitis 2012 and 2013    recurrent; hosp.  2012 -sigmoid res Current:BP (!) 164/67   Pulse 72   Temp 98 °F (36.7 °C)   Resp 18   Wt 45.4 kg   SpO2 98%   BMI 18.29 kg/m²         Physical Exam  Constitutional:  Alert, well nourished adult lying in bed in no distress. Vital signs noted.   Eye:  No scleral icterus re-check      Medications Prescribed:  Current Discharge Medication List

## 2021-09-20 ENCOUNTER — LAB ENCOUNTER (OUTPATIENT)
Dept: LAB | Age: 76
End: 2021-09-20
Attending: INTERNAL MEDICINE
Payer: MEDICARE

## 2021-09-20 ENCOUNTER — OFFICE VISIT (OUTPATIENT)
Dept: INTERNAL MEDICINE CLINIC | Facility: CLINIC | Age: 76
End: 2021-09-20
Payer: MEDICARE

## 2021-09-20 ENCOUNTER — TELEPHONE (OUTPATIENT)
Dept: INTERNAL MEDICINE CLINIC | Facility: CLINIC | Age: 76
End: 2021-09-20

## 2021-09-20 VITALS
HEART RATE: 62 BPM | TEMPERATURE: 97 F | DIASTOLIC BLOOD PRESSURE: 80 MMHG | HEIGHT: 62 IN | OXYGEN SATURATION: 98 % | SYSTOLIC BLOOD PRESSURE: 124 MMHG | WEIGHT: 94 LBS | BODY MASS INDEX: 17.3 KG/M2

## 2021-09-20 DIAGNOSIS — Z12.31 VISIT FOR SCREENING MAMMOGRAM: ICD-10-CM

## 2021-09-20 DIAGNOSIS — E78.00 HYPERCHOLESTEROLEMIA: ICD-10-CM

## 2021-09-20 DIAGNOSIS — K57.30 DIVERTICULOSIS OF COLON: ICD-10-CM

## 2021-09-20 DIAGNOSIS — M54.9 MID BACK PAIN ON RIGHT SIDE: ICD-10-CM

## 2021-09-20 DIAGNOSIS — Z90.49 S/P LAPAROSCOPIC-ASSISTED SIGMOIDECTOMY: ICD-10-CM

## 2021-09-20 DIAGNOSIS — M85.80 OSTEOPENIA, UNSPECIFIED LOCATION: ICD-10-CM

## 2021-09-20 DIAGNOSIS — H90.3 SENSORINEURAL HEARING LOSS (SNHL) OF BOTH EARS: ICD-10-CM

## 2021-09-20 DIAGNOSIS — Z00.00 MEDICARE ANNUAL WELLNESS VISIT, SUBSEQUENT: Primary | ICD-10-CM

## 2021-09-20 DIAGNOSIS — K59.09 CHRONIC CONSTIPATION: ICD-10-CM

## 2021-09-20 DIAGNOSIS — Z86.69 HX OF MIGRAINES: ICD-10-CM

## 2021-09-20 DIAGNOSIS — M17.12 PRIMARY OSTEOARTHRITIS OF LEFT KNEE: ICD-10-CM

## 2021-09-20 DIAGNOSIS — I49.3 PVC'S (PREMATURE VENTRICULAR CONTRACTIONS): ICD-10-CM

## 2021-09-20 DIAGNOSIS — H93.A2 PULSATILE TINNITUS OF LEFT EAR: ICD-10-CM

## 2021-09-20 LAB
ALBUMIN SERPL-MCNC: 4.1 G/DL (ref 3.4–5)
ALBUMIN/GLOB SERPL: 1.3 {RATIO} (ref 1–2)
ALP LIVER SERPL-CCNC: 89 U/L
ALT SERPL-CCNC: 29 U/L
ANION GAP SERPL CALC-SCNC: 5 MMOL/L (ref 0–18)
AST SERPL-CCNC: 20 U/L (ref 15–37)
BASOPHILS # BLD AUTO: 0.05 X10(3) UL (ref 0–0.2)
BASOPHILS NFR BLD AUTO: 1.1 %
BILIRUB SERPL-MCNC: 0.4 MG/DL (ref 0.1–2)
BUN BLD-MCNC: 11 MG/DL (ref 7–18)
BUN/CREAT SERPL: 15.7 (ref 10–20)
CALCIUM BLD-MCNC: 9.4 MG/DL (ref 8.5–10.1)
CHLORIDE SERPL-SCNC: 106 MMOL/L (ref 98–112)
CHOLEST SERPL-MCNC: 169 MG/DL (ref ?–200)
CO2 SERPL-SCNC: 27 MMOL/L (ref 21–32)
CREAT BLD-MCNC: 0.7 MG/DL
DEPRECATED RDW RBC AUTO: 41.1 FL (ref 35.1–46.3)
EOSINOPHIL # BLD AUTO: 0.09 X10(3) UL (ref 0–0.7)
EOSINOPHIL NFR BLD AUTO: 2 %
ERYTHROCYTE [DISTWIDTH] IN BLOOD BY AUTOMATED COUNT: 12.1 % (ref 11–15)
GLOBULIN PLAS-MCNC: 3.2 G/DL (ref 2.8–4.4)
GLUCOSE BLD-MCNC: 94 MG/DL (ref 70–99)
HCT VFR BLD AUTO: 41.3 %
HDLC SERPL-MCNC: 67 MG/DL (ref 40–59)
HGB BLD-MCNC: 13.6 G/DL
IMM GRANULOCYTES # BLD AUTO: 0.02 X10(3) UL (ref 0–1)
IMM GRANULOCYTES NFR BLD: 0.4 %
LDLC SERPL CALC-MCNC: 86 MG/DL (ref ?–100)
LYMPHOCYTES # BLD AUTO: 0.88 X10(3) UL (ref 1–4)
LYMPHOCYTES NFR BLD AUTO: 19.7 %
MCH RBC QN AUTO: 30.6 PG (ref 26–34)
MCHC RBC AUTO-ENTMCNC: 32.9 G/DL (ref 31–37)
MCV RBC AUTO: 92.8 FL
MONOCYTES # BLD AUTO: 0.33 X10(3) UL (ref 0.1–1)
MONOCYTES NFR BLD AUTO: 7.4 %
NEUTROPHILS # BLD AUTO: 3.09 X10 (3) UL (ref 1.5–7.7)
NEUTROPHILS # BLD AUTO: 3.09 X10(3) UL (ref 1.5–7.7)
NEUTROPHILS NFR BLD AUTO: 69.4 %
NONHDLC SERPL-MCNC: 102 MG/DL (ref ?–130)
OSMOLALITY SERPL CALC.SUM OF ELEC: 285 MOSM/KG (ref 275–295)
PATIENT FASTING Y/N/NP: YES
PATIENT FASTING Y/N/NP: YES
PLATELET # BLD AUTO: 198 10(3)UL (ref 150–450)
POTASSIUM SERPL-SCNC: 3.9 MMOL/L (ref 3.5–5.1)
PROT SERPL-MCNC: 7.3 G/DL (ref 6.4–8.2)
RBC # BLD AUTO: 4.45 X10(6)UL
SODIUM SERPL-SCNC: 138 MMOL/L (ref 136–145)
TRIGL SERPL-MCNC: 88 MG/DL (ref 30–149)
TSI SER-ACNC: 0.44 MIU/ML (ref 0.36–3.74)
VLDLC SERPL CALC-MCNC: 14 MG/DL (ref 0–30)
WBC # BLD AUTO: 4.5 X10(3) UL (ref 4–11)

## 2021-09-20 PROCEDURE — 84443 ASSAY THYROID STIM HORMONE: CPT

## 2021-09-20 PROCEDURE — G0439 PPPS, SUBSEQ VISIT: HCPCS | Performed by: INTERNAL MEDICINE

## 2021-09-20 PROCEDURE — 96160 PT-FOCUSED HLTH RISK ASSMT: CPT | Performed by: INTERNAL MEDICINE

## 2021-09-20 PROCEDURE — 80053 COMPREHEN METABOLIC PANEL: CPT

## 2021-09-20 PROCEDURE — 36415 COLL VENOUS BLD VENIPUNCTURE: CPT

## 2021-09-20 PROCEDURE — 3008F BODY MASS INDEX DOCD: CPT | Performed by: INTERNAL MEDICINE

## 2021-09-20 PROCEDURE — 3074F SYST BP LT 130 MM HG: CPT | Performed by: INTERNAL MEDICINE

## 2021-09-20 PROCEDURE — 85025 COMPLETE CBC W/AUTO DIFF WBC: CPT

## 2021-09-20 PROCEDURE — 99397 PER PM REEVAL EST PAT 65+ YR: CPT | Performed by: INTERNAL MEDICINE

## 2021-09-20 PROCEDURE — 3079F DIAST BP 80-89 MM HG: CPT | Performed by: INTERNAL MEDICINE

## 2021-09-20 PROCEDURE — 80061 LIPID PANEL: CPT

## 2021-09-20 NOTE — TELEPHONE ENCOUNTER
I sent patient chart results note–  Ovidio Patiño,  your lab results are all okay. Dr Kelly Chacon. Note to self–  9/20/21–CBC CMP TSH lipid--results okay.

## 2021-09-20 NOTE — PROGRESS NOTES
Jorge Hobbs is a 67year old female who presents for     Medicare annual wellness    Feels good. R mid back pain--first noted 8/26/21 when on a trip to [place], as putting luggage in car, noted pain in R mid back.    Was \"rafal a minor\" pain until 9 polypectomy 10/25/2018   • S/P left hemicolectomy 10/25/2018   • Sigmoid diverticulitis 2012 and 2013    recurrent; hosp.  2012 -sigmoid resection 2013      Past Surgical History:   Procedure Laterality Date   • CATARACT EXTRACTION Bilateral 2006    Dr Olivera Notice Temp 97.1 °F (36.2 °C)   Ht 5' 2\" (1.575 m)   Wt 94 lb (42.6 kg)   SpO2 98%   BMI 17.19 kg/m²       Wt Readings from Last 6 Encounters:  09/20/21 : 94 lb (42.6 kg)  09/03/21 : 100 lb (45.4 kg)  02/05/21 : 99 lb (44.9 kg)  09/18/20 : 99 lb (44.9 kg)  09/16 Beltran for gel shots. Hx pulsatile tinnitus of left ear -neg eval by Dr. Ronel Ayoub in 2017. No tx. (not added to dx list this visit).          Healthcare maintenance:  Vaccines: tdap 8/31/16. shingrix x2 2018. Pmvx 8/3/17. Prevnar 9/11/15.  Flu shot -[facility]     In the past six months, have you lost more than 10 pounds without trying?: 2 - No    Has your appetite been poor?: Yes--chronic --no change for yrs.      Type of Diet: Balanced; Vegetarian    How does the patient maintain a good energy level?: Da Visual Acuity                   Cognitive Assessment     What day of the week is this?: Correct    What month is it?: Correct    What year is it?: Correct    Recall \"Ball\": Correct    Recall \"Flag\": Correct     Recall \"Tree\": Correct        Mar found for this or any previous visit.  Update Immunization Activity if applicable    Tetanus Orders placed or performed in visit on 08/31/16   • TETANUS, DIPHTHERIA TOXOIDS AND ACELLULAR PERTUSIS VACCINE (TDAP), >7 YEARS, IM USE    Update Immunization Activ

## 2021-09-27 ENCOUNTER — PATIENT MESSAGE (OUTPATIENT)
Dept: INTERNAL MEDICINE CLINIC | Facility: CLINIC | Age: 76
End: 2021-09-27

## 2021-09-28 NOTE — TELEPHONE ENCOUNTER
From: Rachana Parmar  To: Wilber Hauser MD  Sent: 9/27/2021 7:32 PM CDT  Subject: Covid / Flu shots    On Friday September 24, 2021, I received my annual seasonal flu shot + my booster Covid vaccine (Ghulam Peter) from [employer] on [address].  Please in

## 2021-11-02 ENCOUNTER — HOSPITAL ENCOUNTER (OUTPATIENT)
Dept: MAMMOGRAPHY | Facility: HOSPITAL | Age: 76
Discharge: HOME | End: 2021-11-02
Attending: INTERNAL MEDICINE
Payer: MEDICARE

## 2021-11-02 DIAGNOSIS — Z12.31 VISIT FOR SCREENING MAMMOGRAM: ICD-10-CM

## 2021-11-02 PROCEDURE — 77067 SCR MAMMO BI INCL CAD: CPT | Performed by: INTERNAL MEDICINE

## 2021-11-02 PROCEDURE — 77063 BREAST TOMOSYNTHESIS BI: CPT | Performed by: INTERNAL MEDICINE

## 2022-02-01 RX ORDER — ATORVASTATIN CALCIUM 10 MG/1
10 TABLET, FILM COATED ORAL EVERY OTHER DAY
Qty: 45 TABLET | Refills: 3 | Status: SHIPPED | OUTPATIENT
Start: 2022-02-01

## 2022-03-09 ENCOUNTER — PATIENT MESSAGE (OUTPATIENT)
Dept: INTERNAL MEDICINE CLINIC | Facility: CLINIC | Age: 77
End: 2022-03-09

## 2022-03-09 NOTE — TELEPHONE ENCOUNTER
From: Zehra Jon  To: Janina Shepherd MD  Sent: 3/9/2022 1:28 AM CST  Subject: cold symptoms    I have a stopped up nose and a sore throat. I have no other symptoms and don't think that I have a fever. Should I be concerned about Covid? How can I be tested? I am concerned about infecting others if I do have Covid. As an F Y I, I am fully vaccinated and boosted. ashley garcía  [phone]  [email or web address]

## 2022-03-10 ENCOUNTER — LAB ENCOUNTER (OUTPATIENT)
Dept: LAB | Facility: HOSPITAL | Age: 77
End: 2022-03-10
Attending: INTERNAL MEDICINE
Payer: MEDICARE

## 2022-03-10 ENCOUNTER — TELEPHONE (OUTPATIENT)
Dept: INTERNAL MEDICINE CLINIC | Facility: CLINIC | Age: 77
End: 2022-03-10

## 2022-03-10 DIAGNOSIS — R09.81 NASAL CONGESTION: ICD-10-CM

## 2022-03-10 DIAGNOSIS — R05.9 COUGH: ICD-10-CM

## 2022-03-10 LAB — SARS-COV-2 RNA RESP QL NAA+PROBE: NOT DETECTED

## 2022-03-10 NOTE — TELEPHONE ENCOUNTER
Pt called, rec'd negative COVID result  Pt temp is now 101  Please call to discuss/advise  Tasked to nursing

## 2022-03-10 NOTE — TELEPHONE ENCOUNTER
To Dr. Yesenia Banks on call to please advise---    Spoke to pt who saw that covid test from today resulted negative. Pt also reporting that she did have a fever and chills about 2 hours ago up to 101. She just rechecked temperature and it Is now 98.8. RN advised she could be evaluated at [facility] for any additional testing due to sore throat and fever. Pt declining at this time. She feels that her sore throat is very minimal and does not feel the need for evaluation at this time. Pt requesting if MD has any additional recommendations for OTC medications that she can take for below symptoms. Currently taking robitussin and decongestant/sinus PE with acetaminophen. Advised that we will route message to on call MD and to call back or seek ER/UC evaluation with worsening symptoms. Pt verbalized understanding.

## 2022-03-10 NOTE — TELEPHONE ENCOUNTER
COVID triage:    Start of symptoms: Monday, 3/7/22    Fever:  [x]  No fever  []  Temperature:   []  Chills  []  Night sweats     Cough:  [] Productive cough  [] Cough with exertion  [x] Dry cough slight     Breathing:  [] Wheezing  [] Pain with deep breathing  [] SOB with exertion  [] SOB at rest  [] Heavy breathing  [] Chest discomfort with deep breathing or coughing    GI Symptoms:  [] Diarrhea  [] Nausea  [] Vomiting  [] Abdominal pain  [] Lack of appetite    Other symptoms:  [x] Sore throat  [] Difficulty swallowing  [x] Nasal drainage clear  [x] Nasal congestion   [] PND  [x] Sinus pressure last night at 3:30am, took a decongestant and felt better  [] Chest congestion  [] Head congestion  [x] Facial pain last night at 3:30am, resolved after she took a decongestant.    [] Ear pain  [x] Body aches  [] Loss of sense of smell   [] Loss of sense of taste  []Conjunctivitis  [x] Headache  [] Fatigue  [] Weakness    [x]OTC Medications: decongestant/sinus PE acetaminophen      [] Any recent travel? [] Any sick contacts? [] Are you a healthcare worker? Vaccinated: Yes  [x]   No []  Booster:  Yes  [x]  No []    Covid testing advised. [place] covid test ordered. Central scheduling number provided. Discussed that since patient is being tested for covid-19 they are considered a person under investigation. Therefore, advised to quarantine until their covid result is known. Informed of 48-72 hr result turn around time. Instructed to self monitor symptoms. ER advised should they develop SOB, chest pain, high fever that's non responsive to fever reducing medication, sp02 <90%. Patient was advised to call our office with new/worsening symptoms.

## 2022-08-31 ENCOUNTER — HOSPITAL ENCOUNTER (OUTPATIENT)
Age: 77
Discharge: HOME | End: 2022-08-31
Attending: EMERGENCY MEDICINE
Payer: MEDICARE

## 2022-08-31 ENCOUNTER — TELEPHONE (OUTPATIENT)
Dept: INTERNAL MEDICINE CLINIC | Facility: CLINIC | Age: 77
End: 2022-08-31

## 2022-08-31 ENCOUNTER — PATIENT MESSAGE (OUTPATIENT)
Dept: INTERNAL MEDICINE CLINIC | Facility: CLINIC | Age: 77
End: 2022-08-31

## 2022-08-31 VITALS
SYSTOLIC BLOOD PRESSURE: 147 MMHG | RESPIRATION RATE: 18 BRPM | TEMPERATURE: 99 F | DIASTOLIC BLOOD PRESSURE: 70 MMHG | OXYGEN SATURATION: 100 % | HEART RATE: 86 BPM

## 2022-08-31 DIAGNOSIS — J02.0 STREPTOCOCCAL SORE THROAT: ICD-10-CM

## 2022-08-31 DIAGNOSIS — U07.1 COVID-19: Primary | ICD-10-CM

## 2022-08-31 LAB
S PYO AG THROAT QL: POSITIVE
SARS-COV-2 RNA RESP QL NAA+PROBE: DETECTED

## 2022-08-31 PROCEDURE — 87880 STREP A ASSAY W/OPTIC: CPT

## 2022-08-31 PROCEDURE — 99214 OFFICE O/P EST MOD 30 MIN: CPT

## 2022-08-31 PROCEDURE — 99213 OFFICE O/P EST LOW 20 MIN: CPT

## 2022-08-31 RX ORDER — NIRMATRELVIR AND RITONAVIR 300-100 MG
KIT ORAL
Qty: 30 TABLET | Refills: 0 | Status: SHIPPED | OUTPATIENT
Start: 2022-08-31 | End: 2022-09-05

## 2022-08-31 RX ORDER — AMOXICILLIN 500 MG/1
500 TABLET, FILM COATED ORAL 2 TIMES DAILY
Qty: 20 TABLET | Refills: 0 | Status: SHIPPED | OUTPATIENT
Start: 2022-08-31 | End: 2022-09-10

## 2022-08-31 NOTE — TELEPHONE ENCOUNTER
From: Somers Flight  To: Laisha Arceo MD  Sent: 8/31/2022 3:27 PM CDT  Subject: minor symptoms    I am experiencing symptoms of a cold. ..stopped up nose, mild sore throat, dry persistent cough, fatigue, but no fever. Symptoms came on quite suddenly. I am scheduled to leave [facility] on Friday morning (today is Wednesday) and wonder if I should have a Covid test. I am probably experiencing a mild sinus infection, but because I will be visiting family members out [facility], I don't want to bring my 1year old grandson a present from [place] that might not be very welcome. What should I do? ashley garcía ([phone])    P.S. Tried calling your office earlier today, but could not get through.

## 2022-08-31 NOTE — TELEPHONE ENCOUNTER
----- Message from Ncaho Barrera sent at 8/31/2022  3:27 PM CDT -----  Regarding: minor symptoms  I am experiencing symptoms of a cold. ..stopped up nose, mild sore throat, dry persistent cough, fatigue,  but no fever. Symptoms came on quite suddenly. I am scheduled to leave [facility] on Friday morning (today is Wednesday) and wonder if I should have a Covid test.  I am probably experiencing a mild sinus infection, but because I will be visiting family members out [facility], I don't want to bring my 1year old grandson a present from [place] that might not be very welcome. What should I do?  ashley garcía ([phone])    P.S. Tried calling your office earlier today, but could not get through.

## 2022-08-31 NOTE — TELEPHONE ENCOUNTER
Called patient who has sore throat ,cough and fatigue , denies fever - RN instructed patient to go to  to be evaluated - possible covid test- she agrees and will go to [place] [facility]/[place] 9/1

## 2022-09-02 ENCOUNTER — PATIENT MESSAGE (OUTPATIENT)
Dept: INTERNAL MEDICINE CLINIC | Facility: CLINIC | Age: 77
End: 2022-09-02

## 2022-09-02 NOTE — TELEPHONE ENCOUNTER
From: Millie Woods  To: Dylan Valdes MD  Sent: 9/2/2022 12:09 PM CDT  Subject: paxlovid    I started taking Paxlovid for Covid on Wednesday, but it makes feel nauseated. This morning I vomited what little food I have been able to get down, so I think that I should stop taking Paxlovid.

## 2022-09-08 ENCOUNTER — PATIENT MESSAGE (OUTPATIENT)
Dept: INTERNAL MEDICINE CLINIC | Facility: CLINIC | Age: 77
End: 2022-09-08

## 2022-09-08 NOTE — TELEPHONE ENCOUNTER
From: Justine Camarillo  To: Matthew Manuel MD  Sent: 9/8/2022 9:53 AM CDT  Subject: Paxlovid interactions    I took Paxlovid briefly for Covid, but stopped the drug on September 2 because of a bad reaction to it (I was vomiting ). I had one dose on September 2 in the morning and have had none since then (today is September 8).  Up until that point, I had only 4 doses in total. When can I resume taking Lipitor, which I was told to discontinue while I was still taking Paxlovid?    ashley garcía  [phone]

## 2022-09-26 ENCOUNTER — OFFICE VISIT (OUTPATIENT)
Dept: INTERNAL MEDICINE CLINIC | Facility: CLINIC | Age: 77
End: 2022-09-26

## 2022-09-26 ENCOUNTER — LAB ENCOUNTER (OUTPATIENT)
Dept: LAB | Age: 77
End: 2022-09-26
Attending: INTERNAL MEDICINE

## 2022-09-26 VITALS
BODY MASS INDEX: 17.11 KG/M2 | HEIGHT: 62 IN | WEIGHT: 93 LBS | OXYGEN SATURATION: 97 % | DIASTOLIC BLOOD PRESSURE: 76 MMHG | SYSTOLIC BLOOD PRESSURE: 115 MMHG | HEART RATE: 78 BPM | TEMPERATURE: 97 F

## 2022-09-26 DIAGNOSIS — K59.09 CHRONIC CONSTIPATION: ICD-10-CM

## 2022-09-26 DIAGNOSIS — H90.3 SENSORINEURAL HEARING LOSS (SNHL) OF BOTH EARS: ICD-10-CM

## 2022-09-26 DIAGNOSIS — Z12.31 VISIT FOR SCREENING MAMMOGRAM: ICD-10-CM

## 2022-09-26 DIAGNOSIS — M85.80 OSTEOPENIA, UNSPECIFIED LOCATION: ICD-10-CM

## 2022-09-26 DIAGNOSIS — Z90.49 S/P LAPAROSCOPIC-ASSISTED SIGMOIDECTOMY: ICD-10-CM

## 2022-09-26 DIAGNOSIS — K57.30 DIVERTICULOSIS OF COLON: ICD-10-CM

## 2022-09-26 DIAGNOSIS — I49.3 PVC'S (PREMATURE VENTRICULAR CONTRACTIONS): ICD-10-CM

## 2022-09-26 DIAGNOSIS — M17.12 PRIMARY OSTEOARTHRITIS OF LEFT KNEE: ICD-10-CM

## 2022-09-26 DIAGNOSIS — E78.00 HYPERCHOLESTEROLEMIA: ICD-10-CM

## 2022-09-26 DIAGNOSIS — Z00.00 MEDICARE ANNUAL WELLNESS VISIT, SUBSEQUENT: Primary | ICD-10-CM

## 2022-09-26 DIAGNOSIS — Z86.69 HX OF MIGRAINES: ICD-10-CM

## 2022-09-26 DIAGNOSIS — Z86.69 HISTORY OF TINNITUS: ICD-10-CM

## 2022-09-26 LAB
ALBUMIN SERPL-MCNC: 3.9 G/DL (ref 3.4–5)
ALBUMIN/GLOB SERPL: 1.2 {RATIO} (ref 1–2)
ALP LIVER SERPL-CCNC: 102 U/L
ALT SERPL-CCNC: 26 U/L
ANION GAP SERPL CALC-SCNC: 6 MMOL/L (ref 0–18)
AST SERPL-CCNC: 19 U/L (ref 15–37)
BASOPHILS # BLD AUTO: 0.03 X10(3) UL (ref 0–0.2)
BASOPHILS NFR BLD AUTO: 0.9 %
BILIRUB SERPL-MCNC: 0.5 MG/DL (ref 0.1–2)
BUN BLD-MCNC: 14 MG/DL (ref 7–18)
BUN/CREAT SERPL: 21.2 (ref 10–20)
CALCIUM BLD-MCNC: 9.1 MG/DL (ref 8.5–10.1)
CHLORIDE SERPL-SCNC: 106 MMOL/L (ref 98–112)
CHOLEST SERPL-MCNC: 164 MG/DL (ref ?–200)
CO2 SERPL-SCNC: 28 MMOL/L (ref 21–32)
CREAT BLD-MCNC: 0.66 MG/DL
DEPRECATED RDW RBC AUTO: 41.8 FL (ref 35.1–46.3)
EOSINOPHIL # BLD AUTO: 0.06 X10(3) UL (ref 0–0.7)
EOSINOPHIL NFR BLD AUTO: 1.9 %
ERYTHROCYTE [DISTWIDTH] IN BLOOD BY AUTOMATED COUNT: 12.2 % (ref 11–15)
FASTING PATIENT LIPID ANSWER: YES
FASTING STATUS PATIENT QL REPORTED: YES
GFR SERPLBLD BASED ON 1.73 SQ M-ARVRAT: 90 ML/MIN/1.73M2 (ref 60–?)
GLOBULIN PLAS-MCNC: 3.3 G/DL (ref 2.8–4.4)
GLUCOSE BLD-MCNC: 89 MG/DL (ref 70–99)
HCT VFR BLD AUTO: 42.2 %
HDLC SERPL-MCNC: 58 MG/DL (ref 40–59)
HGB BLD-MCNC: 13.8 G/DL
IMM GRANULOCYTES # BLD AUTO: 0 X10(3) UL (ref 0–1)
IMM GRANULOCYTES NFR BLD: 0 %
LDLC SERPL CALC-MCNC: 92 MG/DL (ref ?–100)
LYMPHOCYTES # BLD AUTO: 0.77 X10(3) UL (ref 1–4)
LYMPHOCYTES NFR BLD AUTO: 23.8 %
MCH RBC QN AUTO: 30.7 PG (ref 26–34)
MCHC RBC AUTO-ENTMCNC: 32.7 G/DL (ref 31–37)
MCV RBC AUTO: 94 FL
MONOCYTES # BLD AUTO: 0.26 X10(3) UL (ref 0.1–1)
MONOCYTES NFR BLD AUTO: 8 %
NEUTROPHILS # BLD AUTO: 2.11 X10 (3) UL (ref 1.5–7.7)
NEUTROPHILS # BLD AUTO: 2.11 X10(3) UL (ref 1.5–7.7)
NEUTROPHILS NFR BLD AUTO: 65.4 %
NONHDLC SERPL-MCNC: 106 MG/DL (ref ?–130)
OSMOLALITY SERPL CALC.SUM OF ELEC: 290 MOSM/KG (ref 275–295)
PLATELET # BLD AUTO: 193 10(3)UL (ref 150–450)
POTASSIUM SERPL-SCNC: 4.3 MMOL/L (ref 3.5–5.1)
PROT SERPL-MCNC: 7.2 G/DL (ref 6.4–8.2)
RBC # BLD AUTO: 4.49 X10(6)UL
SODIUM SERPL-SCNC: 140 MMOL/L (ref 136–145)
TRIGL SERPL-MCNC: 73 MG/DL (ref 30–149)
TSI SER-ACNC: 0.52 MIU/ML (ref 0.36–3.74)
VLDLC SERPL CALC-MCNC: 12 MG/DL (ref 0–30)
WBC # BLD AUTO: 3.2 X10(3) UL (ref 4–11)

## 2022-09-26 PROCEDURE — 84443 ASSAY THYROID STIM HORMONE: CPT

## 2022-09-26 PROCEDURE — 85025 COMPLETE CBC W/AUTO DIFF WBC: CPT

## 2022-09-26 PROCEDURE — 80053 COMPREHEN METABOLIC PANEL: CPT

## 2022-09-26 PROCEDURE — 80061 LIPID PANEL: CPT

## 2022-09-26 PROCEDURE — 36415 COLL VENOUS BLD VENIPUNCTURE: CPT

## 2022-09-27 ENCOUNTER — TELEPHONE (OUTPATIENT)
Dept: INTERNAL MEDICINE CLINIC | Facility: CLINIC | Age: 77
End: 2022-09-27

## 2022-09-27 DIAGNOSIS — D72.819 LEUKOPENIA, UNSPECIFIED TYPE: Primary | ICD-10-CM

## 2022-10-24 ENCOUNTER — LAB ENCOUNTER (OUTPATIENT)
Dept: LAB | Age: 77
End: 2022-10-24
Attending: INTERNAL MEDICINE
Payer: MEDICARE

## 2022-10-24 ENCOUNTER — TELEPHONE (OUTPATIENT)
Dept: INTERNAL MEDICINE CLINIC | Facility: CLINIC | Age: 77
End: 2022-10-24

## 2022-10-24 DIAGNOSIS — D72.819 LEUKOPENIA, UNSPECIFIED TYPE: ICD-10-CM

## 2022-10-24 LAB
BASOPHILS # BLD AUTO: 0.04 X10(3) UL (ref 0–0.2)
BASOPHILS NFR BLD AUTO: 0.9 %
DEPRECATED RDW RBC AUTO: 42.9 FL (ref 35.1–46.3)
EOSINOPHIL # BLD AUTO: 0.11 X10(3) UL (ref 0–0.7)
EOSINOPHIL NFR BLD AUTO: 2.3 %
ERYTHROCYTE [DISTWIDTH] IN BLOOD BY AUTOMATED COUNT: 12.5 % (ref 11–15)
HCT VFR BLD AUTO: 39.9 %
HGB BLD-MCNC: 13.1 G/DL
IMM GRANULOCYTES # BLD AUTO: 0.01 X10(3) UL (ref 0–1)
IMM GRANULOCYTES NFR BLD: 0.2 %
LYMPHOCYTES # BLD AUTO: 1.04 X10(3) UL (ref 1–4)
LYMPHOCYTES NFR BLD AUTO: 22.2 %
MCH RBC QN AUTO: 31 PG (ref 26–34)
MCHC RBC AUTO-ENTMCNC: 32.8 G/DL (ref 31–37)
MCV RBC AUTO: 94.3 FL
MONOCYTES # BLD AUTO: 0.41 X10(3) UL (ref 0.1–1)
MONOCYTES NFR BLD AUTO: 8.7 %
NEUTROPHILS # BLD AUTO: 3.08 X10 (3) UL (ref 1.5–7.7)
NEUTROPHILS # BLD AUTO: 3.08 X10(3) UL (ref 1.5–7.7)
NEUTROPHILS NFR BLD AUTO: 65.7 %
PLATELET # BLD AUTO: 210 10(3)UL (ref 150–450)
RBC # BLD AUTO: 4.23 X10(6)UL
WBC # BLD AUTO: 4.7 X10(3) UL (ref 4–11)

## 2022-10-24 PROCEDURE — 36415 COLL VENOUS BLD VENIPUNCTURE: CPT

## 2022-10-24 PROCEDURE — 85025 COMPLETE CBC W/AUTO DIFF WBC: CPT

## 2022-10-24 NOTE — TELEPHONE ENCOUNTER
Patient is calling for lab results that were done today  Patient saw in [address] that they are available    Please call patient [phone]

## 2022-10-26 ENCOUNTER — PATIENT MESSAGE (OUTPATIENT)
Dept: INTERNAL MEDICINE CLINIC | Facility: CLINIC | Age: 77
End: 2022-10-26

## 2022-10-27 NOTE — TELEPHONE ENCOUNTER
From: Rima Vidales  To: Polo Belcher MD  Sent: 10/26/2022 12:24 PM CDT  Subject: Covid booster + flu vaccine    I have a question regarding the Covid booster. I was told that because I tested positive for Covid on August 31, 2022, I have to wait 90 days to receive the booster vaccine. This was confirmed by your office and a certain pharmacist at [facility]. However, a different pharmacist told me today that unless I received monoclonal treatment for my Covid, I could go ahead and get the booster now. Naturally, I am confused and just for the record, other than an abbreviated treatment with Paxlovid (only 4 doses), I received no other treatment. I am concerned because of the upcoming Thanksgiving holiday and the hope of getting together with out of town family members at our family gathering. Without the booster, I am not sure if that is safe to do.     Today, October 26 I received my flu vaccine through [address] [place] [address] for the long length of this email.    ~ ashley garcía ~   [phone]

## 2022-11-03 ENCOUNTER — PATIENT MESSAGE (OUTPATIENT)
Dept: INTERNAL MEDICINE CLINIC | Facility: CLINIC | Age: 77
End: 2022-11-03

## 2022-11-03 ENCOUNTER — HOSPITAL ENCOUNTER (OUTPATIENT)
Dept: MAMMOGRAPHY | Facility: HOSPITAL | Age: 77
Discharge: HOME | End: 2022-11-03
Attending: INTERNAL MEDICINE
Payer: MEDICARE

## 2022-11-03 DIAGNOSIS — Z12.31 VISIT FOR SCREENING MAMMOGRAM: ICD-10-CM

## 2022-11-03 PROCEDURE — 77063 BREAST TOMOSYNTHESIS BI: CPT | Performed by: INTERNAL MEDICINE

## 2022-11-03 PROCEDURE — 77067 SCR MAMMO BI INCL CAD: CPT | Performed by: INTERNAL MEDICINE

## 2022-11-04 NOTE — TELEPHONE ENCOUNTER
From: Jelena Venegas  To: Roney Mendez MD  Sent: 11/3/2022 3:27 PM CDT  Subject: update on vaccinations    On October 26, I had my seasonal flu vaccine and yesterday, November 2, I had my Covid booster vaccine (Parmar Peter). ..my 3rd booster.     ashley garcía  [phone]

## 2022-11-29 ENCOUNTER — OFFICE VISIT (OUTPATIENT)
Dept: DERMATOLOGY CLINIC | Facility: CLINIC | Age: 77
End: 2022-11-29
Payer: MEDICARE

## 2022-11-29 DIAGNOSIS — L50.9 URTICARIA: Primary | ICD-10-CM

## 2022-11-29 PROCEDURE — 99203 OFFICE O/P NEW LOW 30 MIN: CPT | Performed by: PHYSICIAN ASSISTANT

## 2022-11-29 PROCEDURE — 1126F AMNT PAIN NOTED NONE PRSNT: CPT | Performed by: PHYSICIAN ASSISTANT

## 2023-02-15 RX ORDER — ATORVASTATIN CALCIUM 10 MG/1
TABLET, FILM COATED ORAL
Qty: 45 TABLET | Refills: 3 | Status: SHIPPED | OUTPATIENT
Start: 2023-02-15

## 2023-08-25 ENCOUNTER — TELEPHONE (OUTPATIENT)
Facility: CLINIC | Age: 78
End: 2023-08-25

## 2023-08-25 NOTE — TELEPHONE ENCOUNTER
----- Message from Mary Madrigal RN sent at 10/29/2018  8:14 AM CDT -----  Regarding: colon recall  Recall colon in 5 years per EBS.  Colon done 10/25/18

## 2023-09-21 ENCOUNTER — TELEPHONE (OUTPATIENT)
Facility: CLINIC | Age: 78
End: 2023-09-21

## 2023-09-21 NOTE — TELEPHONE ENCOUNTER
Last Procedure, Date, MD: Colonscopy,10/25/2018, Fransisco Francisco   Last Diagnosis: tubular adenoma    Recalled (mth/yrs): 5 yr  Sedation Used Previously:  IV  Last Prep Used (if known):  split dose TriLyte preparation   Quality Of Prep (if known): Anticoagulants: none  Diabetic Med's (PO/Injectables): none   Weight loss Med's: none  Iron/Herbal/Multivitamin Supplements (RX/OTC): Multivitamin   Marijuana/Vaping/CBD:none  Height & Weight:5'2 and 93 lb   BMI:17.01 kg/m2  Hx of Cardiac/CVA Issues/(MI/Stroke): none  Devices Pacemaker/Defibrillator/Stents:none   Respiratory Issues/Oxygen Use/YAKOV/COPD: none   Issues w/ Anesthesia: None     Symptoms (Y/N): Per pt after bowel resection in 2012, pt has had a decrease in appetite which has worsened, states has 1 meal daily and stomach feels full. Pt denies nausea. vomiting,abd pain or other GI symptoms. Symptoms Details:     Special Comments/Notes: Confirmed allergies, medications and pharmacy. Rheumatic fever as a child, was told by other providers she has heart murmur . Per pt advised by  murmur could not be detected . Pt scheduled with new PCP  for physical on 10/06/23    Please advise on orders and prep. Thank you!

## 2023-09-25 NOTE — TELEPHONE ENCOUNTER
Given age and GI symptoms would recommend follow up in clinic to discuss possible EGD as well as colonoscopy. Wt Readings from Last 10 Encounters:  09/26/22 : 93 lb (42.2 kg)  09/20/21 : 94 lb (42.6 kg)  09/03/21 : 100 lb (45.4 kg)  02/05/21 : 99 lb (44.9 kg)  09/18/20 : 99 lb (44.9 kg)  09/16/19 : 98 lb (44.5 kg)  10/25/18 : 95 lb (43.1 kg)  10/12/18 : 99 lb (44.9 kg)  09/05/18 : 95 lb (43.1 kg)  04/25/18 : 100 lb (45.4 kg)    Has had weight loss as well  New patient so can see any physician or NP    Thank you.

## 2023-09-25 NOTE — TELEPHONE ENCOUNTER
CSS: Pt needs to be seen in clinic before scheduling procedure per MD. Pt can be scheduled with  any provider except Mu Irving. Please route back to GI triage once scheduled or for questions. Thank you! GRAEME AYERS. GI office number provided. Per MD, pt needs to be seen in clinic before scheduling GI procedure.

## 2023-10-25 ENCOUNTER — LAB ENCOUNTER (OUTPATIENT)
Dept: LAB | Age: 78
End: 2023-10-25
Attending: INTERNAL MEDICINE

## 2023-10-25 ENCOUNTER — OFFICE VISIT (OUTPATIENT)
Dept: INTERNAL MEDICINE CLINIC | Facility: CLINIC | Age: 78
End: 2023-10-25

## 2023-10-25 VITALS
SYSTOLIC BLOOD PRESSURE: 118 MMHG | BODY MASS INDEX: 17 KG/M2 | DIASTOLIC BLOOD PRESSURE: 79 MMHG | HEART RATE: 73 BPM | RESPIRATION RATE: 20 BRPM | TEMPERATURE: 98 F | HEIGHT: 62 IN | OXYGEN SATURATION: 97 %

## 2023-10-25 DIAGNOSIS — Z00.00 MEDICARE ANNUAL WELLNESS VISIT, SUBSEQUENT: ICD-10-CM

## 2023-10-25 DIAGNOSIS — Z12.31 ENCOUNTER FOR SCREENING MAMMOGRAM FOR MALIGNANT NEOPLASM OF BREAST: ICD-10-CM

## 2023-10-25 DIAGNOSIS — Z78.0 POST-MENOPAUSAL: ICD-10-CM

## 2023-10-25 DIAGNOSIS — Z00.00 MEDICARE ANNUAL WELLNESS VISIT, SUBSEQUENT: Primary | ICD-10-CM

## 2023-10-25 LAB
ALBUMIN SERPL-MCNC: 4.2 G/DL (ref 3.4–5)
ALBUMIN/GLOB SERPL: 1.4 {RATIO} (ref 1–2)
ALP LIVER SERPL-CCNC: 82 U/L
ALT SERPL-CCNC: 23 U/L
ANION GAP SERPL CALC-SCNC: 11 MMOL/L (ref 0–18)
AST SERPL-CCNC: 17 U/L (ref 15–37)
BASOPHILS # BLD AUTO: 0.04 X10(3) UL (ref 0–0.2)
BASOPHILS NFR BLD AUTO: 1 %
BILIRUB SERPL-MCNC: 0.4 MG/DL (ref 0.1–2)
BILIRUB UR QL: NEGATIVE
BUN BLD-MCNC: 17 MG/DL (ref 7–18)
BUN/CREAT SERPL: 21 (ref 10–20)
CALCIUM BLD-MCNC: 9.3 MG/DL (ref 8.5–10.1)
CHLORIDE SERPL-SCNC: 105 MMOL/L (ref 98–112)
CHOLEST SERPL-MCNC: 154 MG/DL (ref ?–200)
CLARITY UR: CLEAR
CO2 SERPL-SCNC: 25 MMOL/L (ref 21–32)
CREAT BLD-MCNC: 0.81 MG/DL
DEPRECATED RDW RBC AUTO: 39.9 FL (ref 35.1–46.3)
EGFRCR SERPLBLD CKD-EPI 2021: 74 ML/MIN/1.73M2 (ref 60–?)
EOSINOPHIL # BLD AUTO: 0.05 X10(3) UL (ref 0–0.7)
EOSINOPHIL NFR BLD AUTO: 1.2 %
ERYTHROCYTE [DISTWIDTH] IN BLOOD BY AUTOMATED COUNT: 12 % (ref 11–15)
FASTING PATIENT LIPID ANSWER: YES
FASTING STATUS PATIENT QL REPORTED: YES
GLOBULIN PLAS-MCNC: 3 G/DL (ref 2.8–4.4)
GLUCOSE BLD-MCNC: 85 MG/DL (ref 70–99)
GLUCOSE UR-MCNC: NORMAL MG/DL
HCT VFR BLD AUTO: 39.5 %
HDLC SERPL-MCNC: 72 MG/DL (ref 40–59)
HGB BLD-MCNC: 13.3 G/DL
HGB UR QL STRIP.AUTO: NEGATIVE
IMM GRANULOCYTES # BLD AUTO: 0.01 X10(3) UL (ref 0–1)
IMM GRANULOCYTES NFR BLD: 0.2 %
KETONES UR-MCNC: 20 MG/DL
LDLC SERPL CALC-MCNC: 69 MG/DL (ref ?–100)
LEUKOCYTE ESTERASE UR QL STRIP.AUTO: NEGATIVE
LYMPHOCYTES # BLD AUTO: 0.94 X10(3) UL (ref 1–4)
LYMPHOCYTES NFR BLD AUTO: 22.7 %
MCH RBC QN AUTO: 30.5 PG (ref 26–34)
MCHC RBC AUTO-ENTMCNC: 33.7 G/DL (ref 31–37)
MCV RBC AUTO: 90.6 FL
MONOCYTES # BLD AUTO: 0.33 X10(3) UL (ref 0.1–1)
MONOCYTES NFR BLD AUTO: 8 %
NEUTROPHILS # BLD AUTO: 2.77 X10 (3) UL (ref 1.5–7.7)
NEUTROPHILS # BLD AUTO: 2.77 X10(3) UL (ref 1.5–7.7)
NEUTROPHILS NFR BLD AUTO: 66.9 %
NITRITE UR QL STRIP.AUTO: NEGATIVE
NONHDLC SERPL-MCNC: 82 MG/DL (ref ?–130)
OSMOLALITY SERPL CALC.SUM OF ELEC: 293 MOSM/KG (ref 275–295)
PH UR: 5.5 [PH] (ref 5–8)
PLATELET # BLD AUTO: 170 10(3)UL (ref 150–450)
POTASSIUM SERPL-SCNC: 4.3 MMOL/L (ref 3.5–5.1)
PROT SERPL-MCNC: 7.2 G/DL (ref 6.4–8.2)
PROT UR-MCNC: NEGATIVE MG/DL
RBC # BLD AUTO: 4.36 X10(6)UL
SODIUM SERPL-SCNC: 141 MMOL/L (ref 136–145)
SP GR UR STRIP: 1.01 (ref 1–1.03)
TRIGL SERPL-MCNC: 68 MG/DL (ref 30–149)
TSI SER-ACNC: 0.38 MIU/ML (ref 0.36–3.74)
UROBILINOGEN UR STRIP-ACNC: NORMAL
VLDLC SERPL CALC-MCNC: 10 MG/DL (ref 0–30)
WBC # BLD AUTO: 4.1 X10(3) UL (ref 4–11)

## 2023-10-25 PROCEDURE — 36415 COLL VENOUS BLD VENIPUNCTURE: CPT

## 2023-10-25 PROCEDURE — 85025 COMPLETE CBC W/AUTO DIFF WBC: CPT

## 2023-10-25 PROCEDURE — 81003 URINALYSIS AUTO W/O SCOPE: CPT

## 2023-10-25 PROCEDURE — 80061 LIPID PANEL: CPT

## 2023-10-25 PROCEDURE — 84443 ASSAY THYROID STIM HORMONE: CPT

## 2023-10-25 PROCEDURE — 80053 COMPREHEN METABOLIC PANEL: CPT

## 2023-10-25 RX ORDER — AMOXICILLIN 250 MG
CAPSULE ORAL
COMMUNITY

## 2023-10-26 ENCOUNTER — PATIENT MESSAGE (OUTPATIENT)
Dept: INTERNAL MEDICINE CLINIC | Facility: CLINIC | Age: 78
End: 2023-10-26

## 2023-10-27 NOTE — TELEPHONE ENCOUNTER
DEEPIKA to Dr. Marta Carvalho---    RN does not note that this was added to family history tab in error.

## 2023-10-27 NOTE — TELEPHONE ENCOUNTER
From: April Encinas  To: Vera Genaro  Sent: 10/26/2023 3:34 PM CDT  Subject: Wednesday Oct 25 office visit    During my interview with Dr Maureen Brice, she asked about my parents and their health issues. I spoke about my mother and she asked how my father had , inasmuch as my chart states that I didn't know. I blurted out the fact that my dad committed suicide but after I left the office I realized that I had misspoken myself. In truth, it was my step-father who committed suicide, and it is still a fact, that I do NOT know how my birth father  as he and my mother  when I was a baby. I do want to correct that as I don't want it presumed that depression runs in my family.      ashley gross

## 2023-10-28 ENCOUNTER — PATIENT MESSAGE (OUTPATIENT)
Dept: INTERNAL MEDICINE CLINIC | Facility: CLINIC | Age: 78
End: 2023-10-28

## 2023-10-30 NOTE — TELEPHONE ENCOUNTER
From: Oneal Montero  To: Fannie Alberto  Sent: 10/28/2023 9:21 AM CDT  Subject: Thyroid test    On Wednesday, October 25, Fannie Alberto ordered a slew of lab tests, but told me to put off having the thyroid test because of a certain medication that I had taken that morning.  She said that I would have to wait 3 days to take the test, and we both agreed that I would come in on Saturday, October 28 to do the test. However, when I stopped in this a.m., the lady at the check-in desk had no order on file for me to take that test. I am not too concerned about that but confused as to what to do?    ~ ashley gross ~

## 2023-10-30 NOTE — TELEPHONE ENCOUNTER
Nothing for her to do! I apologize for the confusion! They sadia the lab for her thyroid on 10/25 with all of the others and it was normal even though you she was taking the biotin within 72 hours. All other lab tests were normal with the exception of ketones in the urine which sometimes happen if you are fasting. No further recommendations at this time. Thank you!

## 2023-11-09 NOTE — TELEPHONE ENCOUNTER
Patient has an office visit scheduled with Dr. Mj Yan    Your Appointments      Wednesday January 03, 2024 10:30 AM  New Patient Visit with MD Mich Hutton, [address], [place] ([place] [address]) [address] [facility]  [phone]

## 2024-01-03 ENCOUNTER — TELEPHONE (OUTPATIENT)
Dept: GASTROENTEROLOGY | Facility: CLINIC | Age: 79
End: 2024-01-03

## 2024-01-03 ENCOUNTER — OFFICE VISIT (OUTPATIENT)
Dept: GASTROENTEROLOGY | Facility: CLINIC | Age: 79
End: 2024-01-03

## 2024-01-03 VITALS
WEIGHT: 96 LBS | SYSTOLIC BLOOD PRESSURE: 120 MMHG | DIASTOLIC BLOOD PRESSURE: 76 MMHG | HEIGHT: 62 IN | BODY MASS INDEX: 17.66 KG/M2

## 2024-01-03 DIAGNOSIS — K57.30 DIVERTICULOSIS OF COLON: ICD-10-CM

## 2024-01-03 DIAGNOSIS — D12.6 TUBULAR ADENOMA OF COLON: ICD-10-CM

## 2024-01-03 DIAGNOSIS — Z90.49 S/P LAPAROSCOPIC-ASSISTED SIGMOIDECTOMY: ICD-10-CM

## 2024-01-03 DIAGNOSIS — Z86.010 HISTORY OF COLON POLYPS: ICD-10-CM

## 2024-01-03 DIAGNOSIS — R68.81 EARLY SATIETY: Primary | ICD-10-CM

## 2024-01-03 PROCEDURE — 3074F SYST BP LT 130 MM HG: CPT | Performed by: INTERNAL MEDICINE

## 2024-01-03 PROCEDURE — 1160F RVW MEDS BY RX/DR IN RCRD: CPT | Performed by: INTERNAL MEDICINE

## 2024-01-03 PROCEDURE — 1159F MED LIST DOCD IN RCRD: CPT | Performed by: INTERNAL MEDICINE

## 2024-01-03 PROCEDURE — 3078F DIAST BP <80 MM HG: CPT | Performed by: INTERNAL MEDICINE

## 2024-01-03 PROCEDURE — 3008F BODY MASS INDEX DOCD: CPT | Performed by: INTERNAL MEDICINE

## 2024-01-03 PROCEDURE — 99204 OFFICE O/P NEW MOD 45 MIN: CPT | Performed by: INTERNAL MEDICINE

## 2024-01-03 RX ORDER — SODIUM, POTASSIUM,MAG SULFATES 17.5-3.13G
SOLUTION, RECONSTITUTED, ORAL ORAL
Qty: 1 EACH | Refills: 0 | Status: SHIPPED | OUTPATIENT
Start: 2024-01-03

## 2024-01-03 NOTE — H&P
[facility] - Gastroenterology  Clinic History and Physical     Chief Complaint   Patient presents with    Consult     Last colon was 2018 with Dr. Mesa; hx of polyps; does not have appetite; will get full quickly after a small amount food; does have constipation       HPI:   Kaylyn Serra is a 78 year old female patient of Dr. Harrison, with history of diverticulitis and diverticulitis hx of L hemicolectomy 2013, and inguinal hernia repair 2014, HL, presenting for early satiety, constipation and colonoscopy.    Last colonoscopy was 2018 with TA and nodularity at anastamosis with recall 5 years    Has early satiety for about a year. Told primary and did not think it was different. Does have constipation and possibly from that. Told to try boost. Now primary is Allison Gomez.    Patient denies any GI symptoms of nausea, vomiting, dyspepsia, dysphagia, hematemesis, abdominal pain, change in bowel habits, thin stools, hematochezia, or melena.  Additionally there is no weight loss and no reported history of chest pain or shortness of breath.    Wt Readings from Last 20 Encounters:   01/03/24 96 lb (43.5 kg)   09/26/22 93 lb (42.2 kg)   09/20/21 94 lb (42.6 kg)   09/03/21 100 lb (45.4 kg)   02/05/21 99 lb (44.9 kg)   09/18/20 99 lb (44.9 kg)   09/16/19 98 lb (44.5 kg)   10/25/18 95 lb (43.1 kg)   10/12/18 99 lb (44.9 kg)   09/05/18 95 lb (43.1 kg)   04/25/18 100 lb (45.4 kg)   10/04/17 100 lb 6.4 oz (45.5 kg)   09/29/17 98 lb (44.5 kg)   08/30/17 97 lb (44 kg)   01/11/17 100 lb (45.4 kg)   10/26/16 100 lb (45.4 kg)   08/31/16 102 lb (46.3 kg)   05/26/15 108 lb (49 kg)   04/01/15 105 lb (47.6 kg)   03/25/15 107 lb (48.5 kg)       Family History:  Denies family history of colon CA  No GI cancer    Prior endoscopies:  Colonoscopy 2018  EGD 2014    Soc:  Denies smoking  Denies alcohol  Denies recreational drugs    History, Medications, Allergies, ROS:      Past Medical History:   Diagnosis Date    Acute meniscal tear  of left knee 2016    Dr Gong-herminia shot and PT.     Diverticulosis     colonoscopy Dr. Mesa 2010    Diverticulosis large intestine w/o perforation or abscess w/o bleeding 10/25/2018    DJD (degenerative joint disease) of thoracic spine     MRI T spine in 1/15--right upper back pain resolved.     High cholesterol     Hives 2017    testing per Dr. Hernandez--from fish oil.    Hyperlipidemia     Inflammatory arthritis     Internal hemorrhoids 2010    colonoscopy Dr. Mesa     Left knee DJD     Lumbar degenerative disc disease     Xray lumbar--9/3/21-mod rotary dextroscoliosis of mid lumbar spine, disc space narrowing L1-2 L 4-5.    Migraines     MRI brain 2003    Nodule of colon 10/25/2018    Osteopenia     dexa 2015    Phlegmon     hosp. 2012 for sigmoid divertiulitis with phlegmon    Pulsatile tinnitus 2017    Left ear eval by Dr. Rivera.     PVC's (premature ventricular contractions)     echo and stress echo ok in     Rheumatic fever 1956    S/P colonoscopic polypectomy 10/25/2018    S/P left hemicolectomy 10/25/2018    Sigmoid diverticulitis  and     recurrent; hosp.  -sigmoid resection       Past Surgical History:   Procedure Laterality Date    CATARACT EXTRACTION Bilateral     Dr Mckeon    COLON SURGERY  2013    laparoscopic sigmoid resection-Dr Del Rosario for diverticulitis    COLONOSCOPY  2010    colonoscopy Dr. Mesa     COLONOSCOPY N/A 10/25/2018    Procedure: COLONOSCOPY;  Surgeon: Madie Morales MD;  Location: [facility] ENDOSCOPY    INGUINAL HERNIA REPAIR  2014    repair of incarcerated incisional hernia L groin Dr. Del Rosario    OTHER SURGICAL HISTORY Right 2017    R thumb cyst surgery Dr. Jesus Tesfaye 3/2017-for ganglion cyst and bone spurs.    TUBAL LIGATION        Family Hx:   Family History   Problem Relation Age of Onset    Breast Cancer Mother 75    Lipids Mother     Lung Disorder Mother     Other (COPD) Mother          age 81     Other ( unknown cause age 60) Father     Diabetes Son       Social History:   Social History     Socioeconomic History    Marital status:    Tobacco Use    Smoking status: Never    Smokeless tobacco: Never   Substance and Sexual Activity    Alcohol use: No    Drug use: No   Other Topics Concern    Caffeine Concern Yes     Comment: Coffee 1-3 cups daily; Tea    Reaction to local anesthetic No        Medications (Active prior to today's visit):  Current Outpatient Medications   Medication Sig Dispense Refill    Multiple Vitamins-Minerals (BIOTIN PLUS/CALCIUM/VIT D3) Oral Tab Take by mouth.      ATORVASTATIN 10 MG Oral Tab TAKE ONE TABLET BY MOUTH EVERY OTHER DAY 45 tablet 3    triamcinolone 0.1 % External Ointment Apply 1 Application topically 2 (two) times daily. 453.6 g 1    triamcinolone acetonide 0.1 % External Cream Apply topically 2 (two) times daily as needed. 1 Tube 0    Zolmitriptan 2.5 MG Oral Tab Take one tablet if needed for migraine 2 times a day. 18 tablet 3    docusate sodium 100 MG Oral Cap Take 1 capsule (100 mg total) by mouth daily as needed for constipation. 1 capsule 0    Multiple Vitamin (MULTI-VITAMINS) Oral Tab Take 1 tablet by mouth daily.      aspirin 81 MG Oral Tab Take 1 tablet (81 mg total) by mouth daily.         Allergies:  Allergies   Allergen Reactions    Fish Oil HIVES     Fish oil caused hives (2017--testing per allergist).     Shellfish-Derived Products SWELLING     Facial swelling after shellfish as a teenager.    Sulfanilamide OTHER (SEE COMMENTS)     Other reaction(s): Tingling lips       ROS:   CONSTITUTIONAL:  negative for fevers, rigors  EYES:  negative for diplopia   RESPIRATORY:  negative for severe shortness of breath  CARDIOVASCULAR:  negative for crushing sub-sternal chest pain  GASTROINTESTINAL:  see HPI  GENITOURINARY:  negative for dysuria or gross hematuria  INTEGUMENT/BREAST:  SKIN:  negative for jaundice   ALLERGIC/IMMUNOLOGIC:  negative for hay  fever  ENDOCRINE:  negative for cold intolerance and heat intolerance  MUSCULOSKELETAL:  negative for joint effusion/severe erythema  BEHAVIOR/PSYCH:  negative for psychotic behavior      PHYSICAL EXAM:   /76 (BP Location: Right arm, Patient Position: Sitting, Cuff Size: adult)   Ht 5' 2\" (1.575 m)   Wt 96 lb (43.5 kg)   BMI 17.56 kg/m²       Gen: Patient appears comfortable and in no acute discomfort  HEENT: the sclera appears anicteric, oropharynx clear, mucus membranes appear moist  CV:  the extremities are warm and well perfused   Lung: Moves air well; No labored breathing  Abdomen: soft, non-tender exam in all quadrants without rigidity or guarding, non-distended, no abnormal bowel sounds noted, no masses are palpated  Skin: No jaundice  Ext: no cyanosis, clubbing or edema is evident.   Neuro: Alert and interactive, and gross movements of extremities normal    Labs/Imaging:   Patient's labs and imaging were reviewed and discussed with patient today.      Recent Labs     09/26/22  0836 10/24/22  0855 10/25/23  0940   RBC 4.49 4.23 4.36   HGB 13.8 13.1 13.3   HCT 42.2 39.9 39.5   MCV 94.0 94.3 90.6   MCH 30.7 31.0 30.5   MCHC 32.7 32.8 33.7   RDW 12.2 12.5 12.0   NEPRELIM 2.11 3.08 2.77   WBC 3.2* 4.7 4.1   .0 210.0 170.0     Lab Results   Component Value Date    GLU 85 10/25/2023    BUN 17 10/25/2023    BUNCREA 21.0 (H) 10/25/2023    CREATSERUM 0.81 10/25/2023    ANIONGAP 11 10/25/2023    GFRNAA 84 09/20/2021    GFRAA 97 09/20/2021    CA 9.3 10/25/2023    OSMOCALC 293 10/25/2023    ALKPHO 82 10/25/2023    AST 17 10/25/2023    ALT 23 10/25/2023    ALKPHOS 69 08/31/2016    BILT 0.4 10/25/2023    TP 7.2 10/25/2023    ALB 4.2 10/25/2023    GLOBULIN 3.0 10/25/2023    AGRATIO 1.5 08/31/2016     10/25/2023    K 4.3 10/25/2023     10/25/2023    CO2 25.0 10/25/2023     No results found for: \"PTP\", \"PT\", \"INR\"      ASSESSMENT/PLAN:   Kaylyn Serra is a 78 year old  female patient of   [facility], with history of diverticulitis and diverticulitis hx of L hemicolectomy 2013, and inguinal hernia repair 2014, HL, presenting for early satiety, constipation and colonoscopy.       1. Early satiety    2. Diverticulosis of colon    3. S/P laparoscopic-assisted sigmoidectomy    4. Tubular adenoma of colon    Recommend:  -Schedule EGD/colonoscopy w/MAC for early satiety, history of colon polyps  -Prep: - Buy over the counter dulcolax laxative, and take daily for 3 days prior to drinking the bowel prep.   - Suprep split    ** If MAC @ [facility]/NE:    - NO alcohol, recreational drugs nor erectile dysfunction mediations 24 hours before procedure(s)   - NO herbal supplements or weight loss medications x 7 days prior to the procedure(s)    ** If MAC @ [facility] or IV twilight - continue all medications as prescribed           EGD and Colonoscopy consent: I have discussed the risks, benefits, and alternatives to colonoscopy and EGD with the patient [who demonstrated understanding], including but not limited to the risks of bleeding, infection, pain, death, as well as the risks of anesthesia and perforation all leading to prolonged hospitalization, surgical intervention, or even death. I also specifically mentioned the miss rate of EGD and colonoscopy of 5-10% in the best of all circumstances. The patient has agreed to sign an informed consent and elected to proceed with the procedures with possible intervention [i.e. polypectomy, stent placement, etc.] as indicated.      Orders This Visit:  No orders of the defined types were placed in this encounter.      Meds This Visit:  Requested Prescriptions      No prescriptions requested or ordered in this encounter       Imaging & Referrals:  None         Preet George MD  1/3/2024

## 2024-01-03 NOTE — PATIENT INSTRUCTIONS
1. Early satiety  2. Diverticulosis of colon  3. S/P laparoscopic-assisted sigmoidectomy  4. Tubular adenoma of colon    Recommend:  -Schedule EGD/colonoscopy w/MAC for early satiety, history of colon polyps  -Prep: - Buy over the counter dulcolax laxative, and take daily for 3 days prior to drinking the bowel prep.   - Suprep split    ** If MAC @ [facility]/NE:    - NO alcohol, recreational drugs nor erectile dysfunction mediations 72 hours before procedure(s)   - NO herbal supplements or weight loss medications x 7 days prior to the procedure(s)    ** If MAC @ [facility] or IV twilight - continue all medications as prescribed    Read all bowel prep instructions carefully    AVOID seeds, nuts, popcorn, raw fruits and vegetables (cooked is okay) for 5 days before procedure        >>>Please note: if you were prescribed Suprep for the bowel prep and it is too expensive or not covered by insurance, it is okay to substitute Trilyte (or any similar generic prep). This can be done by notifying the pharmacy or calling our office.

## 2024-01-03 NOTE — TELEPHONE ENCOUNTER
Scheduled for:  Colonoscopy [id number] & EGD 25988  Provider Name:  Dr. Geroge  Date:  4/22/2024  Location:  [facility]  Sedation:  MAC  Time:  3:00 pm, arrival 2:00 pm  Prep:  Suprep  Meds/Allergies Reconciled?:  Physician reviewed  Diagnosis with codes:  Early satiety R68.81; Hx of colon polyps Z86.010  Was patient informed to call insurance with codes (Y/N):  Yes  Referral sent?:  Referral was sent at the time of electronic surgical scheduling.  [facility] or [facility] notified?:  I sent an electronic request to Endo Scheduling and received a confirmation today.    Medication Orders:  N/A  Misc Orders:  Authorized to order new bowel prep prescription per protocol met.   Preferred pharmacy and or alternative method of e-scribing prescription confirmed with patient.      Further instructions given by staff:  Prep instructions were given to pt in the office, pt verbalized understanding.

## 2024-02-29 ENCOUNTER — TELEPHONE (OUTPATIENT)
Dept: INTERNAL MEDICINE CLINIC | Facility: CLINIC | Age: 79
End: 2024-02-29

## 2024-03-01 RX ORDER — ATORVASTATIN CALCIUM 10 MG/1
10 TABLET, FILM COATED ORAL EVERY OTHER DAY
Qty: 45 TABLET | Refills: 3 | Status: SHIPPED
Start: 2024-03-01

## 2024-03-01 RX ORDER — DIPHENOXYLATE HYDROCHLORIDE AND ATROPINE SULFATE 2.5; .025 MG/1; MG/1
1 TABLET ORAL DAILY
Qty: 90 TABLET | Refills: 3 | Status: SHIPPED | OUTPATIENT
Start: 2024-03-01

## 2024-03-01 NOTE — TELEPHONE ENCOUNTER
Script faxed to [facility], F:[phone]    Sent to pharmacy as: Atorvastatin Calcium 10 MG Oral Tablet (Lipitor)    Class: Fax    E-Prescribing Status: Transmission to pharmacy failed (3/1/2024 11:11 AM CST)

## 2024-04-04 ENCOUNTER — TELEPHONE (OUTPATIENT)
Facility: CLINIC | Age: 79
End: 2024-04-04

## 2024-04-04 NOTE — TELEPHONE ENCOUNTER
Called and spoke with patient, name/ verified.    Informed pt that Dr. George will see and talk to her on the day of procedure before they bring her to the procedure room, reminded her to also inform the nurse that she wants to speak w/ Dr. George once she checks in.     Pt verbalized understanding.     No further questions at this time,

## 2024-04-04 NOTE — TELEPHONE ENCOUNTER
Pt is scheduled for a CLN on 4/22/24.  She wants to know if the physician will speak with her on that day prior to procedure.  She states that she has some questions.  Please call

## 2024-04-20 NOTE — OR NURSING
Patient called [facility] to confirm new procedure time on Monday 4/22/24 at 2:00 PM. Patient instructed to arrive at 1:00 PM and instructed to not drink anything 3 hours before procedure start time. Patient agreeable.

## 2024-04-22 ENCOUNTER — ANESTHESIA (OUTPATIENT)
Dept: ENDOSCOPY | Facility: HOSPITAL | Age: 79
End: 2024-04-22
Payer: MEDICARE

## 2024-04-22 ENCOUNTER — ANESTHESIA EVENT (OUTPATIENT)
Dept: ENDOSCOPY | Facility: HOSPITAL | Age: 79
End: 2024-04-22
Payer: MEDICARE

## 2024-04-22 ENCOUNTER — HOSPITAL ENCOUNTER (OUTPATIENT)
Facility: HOSPITAL | Age: 79
Setting detail: HOSPITAL OUTPATIENT SURGERY
Discharge: HOME | End: 2024-04-22
Attending: INTERNAL MEDICINE | Admitting: INTERNAL MEDICINE
Payer: MEDICARE

## 2024-04-22 VITALS
HEIGHT: 62 IN | OXYGEN SATURATION: 96 % | HEART RATE: 72 BPM | DIASTOLIC BLOOD PRESSURE: 70 MMHG | RESPIRATION RATE: 16 BRPM | BODY MASS INDEX: 17.3 KG/M2 | WEIGHT: 94 LBS | SYSTOLIC BLOOD PRESSURE: 137 MMHG

## 2024-04-22 DIAGNOSIS — R68.81 EARLY SATIETY: ICD-10-CM

## 2024-04-22 DIAGNOSIS — Z86.010 HISTORY OF COLON POLYPS: ICD-10-CM

## 2024-04-22 PROCEDURE — 0DBN8ZX EXCISION OF SIGMOID COLON, VIA NATURAL OR ARTIFICIAL OPENING ENDOSCOPIC, DIAGNOSTIC: ICD-10-PCS | Performed by: INTERNAL MEDICINE

## 2024-04-22 PROCEDURE — 0DB68ZX EXCISION OF STOMACH, VIA NATURAL OR ARTIFICIAL OPENING ENDOSCOPIC, DIAGNOSTIC: ICD-10-PCS | Performed by: INTERNAL MEDICINE

## 2024-04-22 PROCEDURE — 43239 EGD BIOPSY SINGLE/MULTIPLE: CPT | Performed by: INTERNAL MEDICINE

## 2024-04-22 PROCEDURE — 0DBM8ZX EXCISION OF DESCENDING COLON, VIA NATURAL OR ARTIFICIAL OPENING ENDOSCOPIC, DIAGNOSTIC: ICD-10-PCS | Performed by: INTERNAL MEDICINE

## 2024-04-22 PROCEDURE — 45380 COLONOSCOPY AND BIOPSY: CPT | Performed by: INTERNAL MEDICINE

## 2024-04-22 RX ORDER — SODIUM CHLORIDE, SODIUM LACTATE, POTASSIUM CHLORIDE, CALCIUM CHLORIDE 600; 310; 30; 20 MG/100ML; MG/100ML; MG/100ML; MG/100ML
INJECTION, SOLUTION INTRAVENOUS CONTINUOUS
Status: DISCONTINUED | OUTPATIENT
Start: 2024-04-22 | End: 2024-04-22

## 2024-04-22 RX ORDER — NALOXONE HYDROCHLORIDE 0.4 MG/ML
0.08 INJECTION, SOLUTION INTRAMUSCULAR; INTRAVENOUS; SUBCUTANEOUS ONCE AS NEEDED
Status: DISCONTINUED | OUTPATIENT
Start: 2024-04-22 | End: 2024-04-22

## 2024-04-22 RX ORDER — LIDOCAINE HYDROCHLORIDE 10 MG/ML
INJECTION, SOLUTION EPIDURAL; INFILTRATION; INTRACAUDAL; PERINEURAL AS NEEDED
Status: DISCONTINUED | OUTPATIENT
Start: 2024-04-22 | End: 2024-04-22 | Stop reason: SURG

## 2024-04-22 RX ADMIN — SODIUM CHLORIDE, SODIUM LACTATE, POTASSIUM CHLORIDE, CALCIUM CHLORIDE: 600; 310; 30; 20 INJECTION, SOLUTION INTRAVENOUS at 14:34:00

## 2024-04-22 RX ADMIN — LIDOCAINE HYDROCHLORIDE 20 MG: 10 INJECTION, SOLUTION EPIDURAL; INFILTRATION; INTRACAUDAL; PERINEURAL at 13:58:00

## 2024-04-22 RX ADMIN — SODIUM CHLORIDE, SODIUM LACTATE, POTASSIUM CHLORIDE, CALCIUM CHLORIDE: 600; 310; 30; 20 INJECTION, SOLUTION INTRAVENOUS at 13:56:00

## 2024-04-22 NOTE — ANESTHESIA PREPROCEDURE EVALUATION
Anesthesia PreOp Note    HPI:     Kaylyn Serra is a 78 year old female who presents for preoperative consultation requested by: Preet George MD    Date of Surgery: 4/22/2024    Procedure(s):  COLONOSCOPY  ESOPHAGOGASTRODUODENOSCOPY (EGD)  Indication: Early satiety /History of colon polyps    Relevant Problems   No relevant active problems       NPO:  Last Liquid Consumption Date: 04/22/24  Last Liquid Consumption Time: 1000  Last Solid Consumption Date: 04/21/24  Last Solid Consumption Time: 0700  Last Liquid Consumption Date: 04/22/24          History Review:  Patient Active Problem List    Diagnosis Date Noted    Hx of migraines 09/18/2020    Sensorineural hearing loss (SNHL) of both ears 08/30/2017    Hypercholesterolemia 08/30/2017    Primary osteoarthritis of left knee 08/30/2017    S/P laparoscopic-assisted sigmoidectomy 04/29/2013    Osteopenia 07/05/2011    Diverticulosis of colon 07/05/2011       Past Medical History:    Acute meniscal tear of left knee    Dr Gong-herminia shot and PT.     Diverticulosis    colonoscopy Dr. Mesa 2010    Diverticulosis large intestine w/o perforation or abscess w/o bleeding    DJD (degenerative joint disease) of thoracic spine    MRI T spine in 1/15--right upper back pain resolved.     High cholesterol    Hives    testing per Dr. Hernandez--from fish oil.    Hyperlipidemia    Inflammatory arthritis    Internal hemorrhoids    colonoscopy Dr. Mesa 2010    Left knee DJD    Lumbar degenerative disc disease    Xray lumbar--9/3/21-mod rotary dextroscoliosis of mid lumbar spine, disc space narrowing L1-2 L 4-5.    Migraines    MRI brain 2003    Nodule of colon    Osteopenia    dexa 2015    Phlegmon    hosp. 8/2012 for sigmoid divertiulitis with phlegmon    Pulsatile tinnitus    Left ear eval by Dr. Rivera.     PVC's (premature ventricular contractions)    echo and stress echo ok in 2019    Rheumatic fever    S/P colonoscopic polypectomy    S/P left hemicolectomy    Sigmoid  diverticulitis    recurrent; hosp. 2012 -sigmoid resection 2013       Past Surgical History:   Procedure Laterality Date    Cataract extraction Bilateral 2006    Dr Mckeon    Colon surgery  04/2013    laparoscopic sigmoid resection-Dr Del Rosario for diverticulitis    Colonoscopy  01/2010    colonoscopy Dr. Mesa     Colonoscopy N/A 10/25/2018    Procedure: COLONOSCOPY;  Surgeon: Madie Morales MD;  Location: [facility] ENDOSCOPY    Inguinal hernia repair  11/2014    repair of incarcerated incisional hernia L groin Dr. Del Rosario    Other surgical history Right 03/2017    R thumb cyst surgery Dr. Jesus Tesfaye 3/2017-for ganglion cyst and bone spurs.    Tubal ligation  1986       Medications Prior to Admission   Medication Sig Dispense Refill Last Dose    atorvastatin 10 MG Oral Tab Take 1 tablet (10 mg total) by mouth every other day. 45 tablet 3 4/19/2024 at 2100    Multiple Vitamin (THERA/BETA-CAROTENE) Oral Tab Take 1 tablet by mouth daily. 90 tablet 3 4/15/2024 at 0900    Multiple Vitamins-Minerals (BIOTIN PLUS/CALCIUM/VIT D3) Oral Tab Take by mouth.   4/15/2024 at 0900    Zolmitriptan 2.5 MG Oral Tab Take one tablet if needed for migraine 2 times a day. 18 tablet 3 prn    docusate sodium 100 MG Oral Cap Take 1 capsule (100 mg total) by mouth daily as needed for constipation. 1 capsule 0 prn    aspirin 81 MG Oral Tab Take 1 tablet (81 mg total) by mouth daily.   4/20/2024 at 2100    Na Sulfate-K Sulfate-Mg Sulf (SUPREP BOWEL PREP KIT) 17.5-3.13-1.6 GM/177ML Oral Solution Take prep as directed by Gastroenterology office. Follow instructions provided in office, or visit our website at [email or web address] 1 each 0     triamcinolone 0.1 % External Ointment Apply 1 Application topically 2 (two) times daily. 453.6 g 1     triamcinolone acetonide 0.1 % External Cream Apply topically 2 (two) times daily as needed. 1 Tube 0      Current Facility-Administered  Medications Ordered in Epic   Medication Dose Route Frequency Provider Last Rate Last Admin    lactated ringers infusion   Intravenous Continuous Preet George MD         No current [facility]-ordered outpatient medications on file.       Allergies   Allergen Reactions    Fish Oil HIVES     Fish oil caused hives (2017--testing per allergist).     Shellfish-Derived Products SWELLING     Facial swelling after shellfish as a teenager.    Sulfanilamide OTHER (SEE COMMENTS)     Other reaction(s): Tingling lips       Family History   Problem Relation Age of Onset    Breast Cancer Mother 75    Lipids Mother     Lung Disorder Mother     Other (COPD) Mother          age 81    Other ( unknown cause age 60) Father     Diabetes Son      Social History     Socioeconomic History    Marital status:    Tobacco Use    Smoking status: Never    Smokeless tobacco: Never   Substance and Sexual Activity    Alcohol use: No    Drug use: No   Other Topics Concern    Caffeine Concern Yes     Comment: Coffee 1-3 cups daily; Tea    Reaction to local anesthetic No       Available pre-op labs reviewed.             Vital Signs:  Body mass index is 17.19 kg/m².   height is 1.575 m (5' 2\") and weight is 42.6 kg (94 lb). Her blood pressure is 130/105 (abnormal) and her pulse is 68. Her respiration is 14 and oxygen saturation is 95%.   Vitals:    24 0959 24 1307   BP:  (!) 130/105   Pulse:  68   Resp:  14   SpO2:  95%   Weight: 42.6 kg (94 lb) 42.6 kg (94 lb)   Height: 1.575 m (5' 2\") 1.575 m (5' 2\")        Anesthesia Evaluation     Patient summary reviewed and Nursing notes reviewed    Airway   Mallampati: II  TM distance: >3 FB  Neck ROM: full  Dental    (+) implants and partials    Pulmonary - negative ROS and normal exam   Cardiovascular - negative ROS and normal exam    Neuro/Psych    (+)  headaches,        GI/Hepatic/Renal    (+) bowel prep    Endo/Other - negative ROS   Abdominal  - normal exam                 Anesthesia  Plan:   ASA:  2  Plan:   MAC      I have informed Kaylyn Serra and/or legal guardian or family member of the nature of the anesthetic plan, benefits, risks including possible dental damage if relevant, major complications, and any alternative forms of anesthetic management.   All of the patient's questions were answered to the best of my ability. The patient desires the anesthetic management as planned.  Marya Man CRNA  4/22/2024 1:52 PM  Present on Admission:  **None**

## 2024-04-22 NOTE — DISCHARGE INSTRUCTIONS
Home Care Instructions for Colonoscopy and/or Gastroscopy with Sedation    Diet:  - Resume your regular diet as tolerated unless otherwise instructed.  - Start with light meals to minimize bloating.  - Do not drink alcohol today.    Medication:  - If you have questions about resuming your normal medications, please contact your Primary Care Physician.    Activities:  - Take it easy today. Do not return to work today.  - Do not drive today.  - Do not operate any machinery today (including kitchen equipment).    Colonoscopy:  - You may notice some rectal \"spotting\" (a little blood on the toilet tissue) for a day or two after the exam. This is normal.  - If you experience any rectal bleeding (not spotting), persistent tenderness or sharp severe abdominal pains, oral temperature over 100 degrees Fahrenheit, light-headedness or dizziness, or any other problems, contact your doctor.    Gastroscopy:  - You may have a sore throat for 2-3 days following the exam. This is normal. Gargling with warm salt water (1/2 tsp salt to 1 glass warm water) or using throat lozenges will help.  - If you experience any sharp pain in your neck, abdomen or chest, vomiting of blood, oral temperature over 100 degrees Fahrenheit, light-headedness or dizziness, or any other problems, contact your doctor.    **If unable to reach your doctor, please go to the [facility] Emergency Room**    - Your referring physician will receive a full report of your examination.  - If you do not hear from your doctor's office within two weeks of your biopsy, please call them for your results.    You may be able to see your laboratory results in [employer] between 4 and 7 business days.  In some cases, your physician may not have viewed the results before they are released to [employer].  If you have questions regarding your results contact the physician who ordered the test/exam by phone or via [employer] by choosing \"Ask a Medical Question.\"

## 2024-04-22 NOTE — OPERATIVE REPORT
ESOPHAGOGASTRODUODENOSCOPY (EGD) & COLONOSCOPY REPORT    Kaylyn Serra     1945 Age 78 year old   PCP Hoa Cooper DO Endoscopist Preet George MD     Date of procedure: 24    Procedure: EGD w/biopsies & Colonoscopy w/cold biopsy polypectomy and cold biopsy    Pre-operative diagnosis: early satiety and surveillance colonoscopy    Post-operative diagnosis: gastric erythema, hiatal hernia, colon polyp, diverticulosis, sigmoid anastomosis, lipoma, hemorrhoids    Medications: MAC sedation    Withdrawal time: 12 minutes    Complications: none    Procedure: Informed consent was obtained from the patient after the risks of the procedure were discussed, including but not limited to bleeding, perforation, aspiration, infection, or possibility of a missed lesion. We discussed the risks/benefits and alternatives to this procedure, as well as the planned sedation. EGD procedure: The patient was placed in the left lateral decubitus position and begun on continuous blood pressure pulse oximetry and EKG monitoring and this was maintained throughout the procedure. Once an adequate level of sedation was obtained a bite block was placed. Then the lubricated tip of the [id number] diagnostic video upper endoscope was inserted and advanced using direct visualization into the posterior pharynx and ultimately into the esophagus. Colonoscopy procedure: Once an adequate level of sedation was obtained a digital rectal exam was completed. Then the lubricated tip of the Pikdkhp-PVZXH-350 diagnostic video colonoscope was inserted and advanced without difficulty to the cecum using the CO2 insufflation technique. The cecum was identified by localizing the trifold, the appendix and the ileocecal valve. A routine second examination of the cecum/ascending colon was performed. Withdrawal was begun with thorough washing and careful examination of the colonic walls and folds. Photodocumentation was obtained. The bowel  prep was good. Views of the colon were good with washing. I then carefully withdrew the instrument from the patient who tolerated the procedure well.     Complications: None    EGD findings:      1. Esophagus: The squamocolumnar junction was noted at 37 cm and appeared regular. The GE junction was noted at 37 cm from the incisors. 1-2 cm hiatal hernia. The esophageal mucosa appeared normal. There was no evidence of esophagitis, stricture or endoscopic evidence of Castro's esophagus.  2. Stomach: The stomach distended normally. Normal rugal folds were seen. The pylorus was patent. The gastric mucosa appeared erythematous so biopsied to rule out H. pylori. Retroflexion revealed a normal fundus and a normal cardia.   3. Duodenum: The duodenal mucosa appeared normal in the 1st and 2nd portion of the duodenum.     Colonoscopy findings:    1. SHARON: normal rectal tone, no masses palpated.   2. 1 polyp(s) noted as follows:      A. 4 mm polyp in the descending colon; flat morphology; cold biopsy polypectomy and retrieved.  3. Terminal ileum: the visualized mucosa appeared normal.  4. The colonic mucosa throughout the colon showed normal vascular pattern, without evidence of angioectasias or inflammation. Sigmoid anastomosis noted with suture. Distal to suture there was a 4 cm lipoma with pillow sign, biopsied.   5. Diverticulosis: pandiverticulosis.  6. A retroflexed view of the rectum revealed hemorrhoids.      Impression:  No obvious etiology for early satiety, possibly constipation    Recommend:  Await pathology. No further screening colonoscopies recommended. If new signs or symptoms develop, colonoscopy may need to be repeated sooner.   High fiber diet.  Monitor for blood in the stool. If having more than just tinge of blood, call office or go to the ER.  Here are some reflux precautions:   - Avoid trigger foods  - Anti-reflux measures: raising the head of the bed at night, avoiding tight clothing or belts, avoiding  eating late at night and not lying down shortly after mealtime and achieving weight loss   - Avoid NSAID's, caffeine, peppermints, alcohol, tobacco and foods that incite symptoms     >>>If tissue was obtained and you have not received your pathology results either by phone or letter within 2 weeks, please call our office at [phone].    Specimens: gastric biopsies, descending polyp and sigmoid polypoid lesion/lipoma biopsies

## 2024-04-22 NOTE — H&P
Pre Procedure History & Physical Examination    Patient Name: Kaylyn Serra  MRN: [id number]  [facility]: [id number]  YOB: 1945    Diagnosis: early satiety and screening colonoscopy    Medications Prior to Admission   Medication Sig Dispense Refill Last Dose    atorvastatin 10 MG Oral Tab Take 1 tablet (10 mg total) by mouth every other day. 45 tablet 3 4/19/2024 at 2100    Multiple Vitamin (THERA/BETA-CAROTENE) Oral Tab Take 1 tablet by mouth daily. 90 tablet 3 4/15/2024 at 0900    Multiple Vitamins-Minerals (BIOTIN PLUS/CALCIUM/VIT D3) Oral Tab Take by mouth.   4/15/2024 at 0900    Zolmitriptan 2.5 MG Oral Tab Take one tablet if needed for migraine 2 times a day. 18 tablet 3 prn    docusate sodium 100 MG Oral Cap Take 1 capsule (100 mg total) by mouth daily as needed for constipation. 1 capsule 0 prn    aspirin 81 MG Oral Tab Take 1 tablet (81 mg total) by mouth daily.   4/20/2024 at 2100    Na Sulfate-K Sulfate-Mg Sulf (SUPREP BOWEL PREP KIT) 17.5-3.13-1.6 GM/177ML Oral Solution Take prep as directed by Gastroenterology office. Follow instructions provided in office, or visit our website at [email or web address] 1 each 0     triamcinolone 0.1 % External Ointment Apply 1 Application topically 2 (two) times daily. 453.6 g 1     triamcinolone acetonide 0.1 % External Cream Apply topically 2 (two) times daily as needed. 1 Tube 0      Current Facility-Administered Medications   Medication Dose Route Frequency    lactated ringers infusion   Intravenous Continuous       Allergies:   Allergies   Allergen Reactions    Fish Oil HIVES     Fish oil caused hives (2017--testing per allergist).     Shellfish-Derived Products SWELLING     Facial swelling after shellfish as a teenager.    Sulfanilamide OTHER (SEE COMMENTS)     Other reaction(s): Tingling lips       Past Medical History:    Acute meniscal tear of left knee    Dr Gong-herminia shot and PT.      Diverticulosis    colonoscopy Dr. Mesa 2010    Diverticulosis large intestine w/o perforation or abscess w/o bleeding    DJD (degenerative joint disease) of thoracic spine    MRI T spine in 1/15--right upper back pain resolved.     High cholesterol    Hives    testing per Dr. Hernandez--from fish oil.    Hyperlipidemia    Inflammatory arthritis    Internal hemorrhoids    colonoscopy Dr. Mesa     Left knee DJD    Lumbar degenerative disc disease    Xray lumbar--9/3/21-mod rotary dextroscoliosis of mid lumbar spine, disc space narrowing L1-2 L 4-5.    Migraines    MRI brain     Nodule of colon    Osteopenia    dexa 2015    Phlegmon    hosp. 2012 for sigmoid divertiulitis with phlegmon    Pulsatile tinnitus    Left ear eval by Dr. Rivera.     PVC's (premature ventricular contractions)    echo and stress echo ok in     Rheumatic fever    S/P colonoscopic polypectomy    S/P left hemicolectomy    Sigmoid diverticulitis    recurrent; hosp.  -sigmoid resection      Past Surgical History:   Procedure Laterality Date    Cataract extraction Bilateral     Dr Mckeon    Colon surgery  2013    laparoscopic sigmoid resection-Dr Del Roasrio for diverticulitis    Colonoscopy  2010    colonoscopy Dr. Mesa     Colonoscopy N/A 10/25/2018    Procedure: COLONOSCOPY;  Surgeon: Madie Morales MD;  Location: [facility] ENDOSCOPY    Inguinal hernia repair  2014    repair of incarcerated incisional hernia L groin Dr. Del Rosario    Other surgical history Right 2017    R thumb cyst surgery Dr. Jesus Tesfaye 3/2017-for ganglion cyst and bone spurs.    Tubal ligation  1986     Family History   Problem Relation Age of Onset    Breast Cancer Mother 75    Lipids Mother     Lung Disorder Mother     Other (COPD) Mother          age 81    Other ( unknown cause age 60) Father     Diabetes Son      Social History     Tobacco Use    Smoking status: Never    Smokeless tobacco: Never   Substance Use Topics     Alcohol use: No         ROS:   CONSTITUTIONAL:  negative for fevers, rigors  EYES:  negative for diplopia   RESPIRATORY:  negative for severe shortness of breath  CARDIOVASCULAR:  negative for crushing sub-sternal chest pain  GASTROINTESTINAL:  see HPI  GENITOURINARY:  negative for dysuria or gross hematuria  INTEGUMENT/BREAST:  SKIN:  negative for jaundice   ALLERGIC/IMMUNOLOGIC:  negative for hay fever  ENDOCRINE:  negative for cold intolerance and heat intolerance  MUSCULOSKELETAL:  negative for joint effusion/severe erythema  BEHAVIOR/PSYCH:  negative for psychotic behavior      PHYSICAL EXAM:   BP (!) 130/105 (BP Location: Left arm)   Pulse 68   Resp 14   Ht 5' 2\" (1.575 m)   Wt 94 lb (42.6 kg)   SpO2 95%   BMI 17.19 kg/m²       Gen: Patient appears comfortable and in no acute discomfort  HEENT: the sclera appears anicteric, oropharynx clear, mucus membranes appear moist  CV: regular rate and rhythm, the extremities are warm and well perfused   Lung: Moves air well; No labored breathing  Abdomen: soft, non-tender exam in all quadrants without rigidity or guarding, non-distended, no abnormal bowel sounds noted, no masses are palpated  Skin: No jaundice  Ext: no cyanosis, clubbing or edema is evident.   Neuro: Alert and interactive, and gross movements of extremities normal    I have discussed the risks and benefits and alternatives of the procedure with the patient/family.  They understand and agree to proceed with plan of care.   I have reviewed recent H&P/clinic notes  Preet George MD  [facility] - Gastroenterology  4/22/2024  1:42 PM

## 2024-04-22 NOTE — ANESTHESIA POSTPROCEDURE EVALUATION
Patient: Kaylyn Pierce Gross    Procedure Summary       Date: 04/22/24 Room / Location: [facility] ENDOSCOPY 03 / [facility] ENDOSCOPY    Anesthesia Start: 1356 Anesthesia Stop:     Procedures:       COLONOSCOPY      ESOPHAGOGASTRODUODENOSCOPY (EGD) Diagnosis:       Early satiety      History of colon polyps      (gastric erythema, hiatal hernia, diverticulosis, colon polyp, sigmoid anastomosis, lipoma)    Surgeons: Preet George MD Anesthesiologist: Marya Man CRNA    Anesthesia Type: MAC ASA Status: 2            Anesthesia Type: MAC    Vitals Value Taken Time   /87 04/22/24 1441   Temp 97 04/22/24 1441   Pulse 66 04/22/24 1441   Resp 15 04/22/24 1441   SpO2 95 04/22/24 1441       [facility] AN Post Evaluation:   Patient Evaluated in PACU  Patient Participation: complete - patient participated  Level of Consciousness: awake  Pain Management: adequate  Airway Patency:patent  Dental exam unchanged from preop  Yes    Nausea/Vomiting: none  Cardiovascular Status: acceptable and hemodynamically stable  Respiratory Status: acceptable  Postoperative Hydration acceptable      Marya Man CRNA  4/22/2024 2:41 PM

## 2024-04-22 NOTE — PRE-SEDATION ASSESSMENT
Physician Pre-Sedation Assessment    Pre-Sedation Assessment:    Sedation History: Previous Sedation with No Complications and Airway Assessed    Cardiac: normal S1, S2  Respiratory: breath sounds clear bilaterally   Abdomen: soft, BS (+), non-tender    ASA Classification: 2. Patient with mild systemic disease    Plan: MAC sedation     31-Oct-2021 19:37

## 2024-04-25 ENCOUNTER — TELEPHONE (OUTPATIENT)
Facility: CLINIC | Age: 79
End: 2024-04-25

## 2024-04-25 NOTE — TELEPHONE ENCOUNTER
Called and patient reviewed findings discussed no further screening/surveillance colonoscopies unless new symptoms  Reviewed no Hpylori and to try to minimize NSAIDs    Patient was very appreciative and had wonderful things to say about the team and all providers.     All questions answered     GI Staff: Please put in recall no further screening coloscopies-- up-to-date    Thank you.

## 2024-04-25 NOTE — TELEPHONE ENCOUNTER
Dr. George,    Patient requesting results from procedure done on 4/22/24. Please advise, thank you.

## 2024-04-30 ENCOUNTER — PATIENT MESSAGE (OUTPATIENT)
Dept: INTERNAL MEDICINE CLINIC | Facility: CLINIC | Age: 79
End: 2024-04-30

## 2024-04-30 NOTE — TELEPHONE ENCOUNTER
From: Ashley Serra  Sent: 4/30/2024 4:26 PM CDT  To: [facility] Clinical Staff  Subject: Splinter in my finger    Not a medical emergency, but 2 weeks ago I got a splinter in the inner joint of my index finger. I can't see it, and a callous seems to have formed over it and it is very painful. I don't know what to do. Should I go to Immediate Care, or should I contact a dermatologist? I've tried soaking it in epsom salts (a remedy I found on the internet) as well as soaking it in hydrogen peroxide--all to no avail. What is your suggestion?  ashely serra  [phone]  [email or web address]

## 2024-06-03 ENCOUNTER — HOSPITAL ENCOUNTER (OUTPATIENT)
Dept: GENERAL RADIOLOGY | Facility: HOSPITAL | Age: 79
Discharge: HOME | End: 2024-06-03
Attending: INTERNAL MEDICINE
Payer: MEDICARE

## 2024-06-03 ENCOUNTER — LAB ENCOUNTER (OUTPATIENT)
Dept: LAB | Facility: HOSPITAL | Age: 79
End: 2024-06-03
Attending: INTERNAL MEDICINE
Payer: MEDICARE

## 2024-06-03 ENCOUNTER — OFFICE VISIT (OUTPATIENT)
Dept: INTERNAL MEDICINE CLINIC | Facility: CLINIC | Age: 79
End: 2024-06-03
Payer: MEDICARE

## 2024-06-03 VITALS
HEIGHT: 62 IN | WEIGHT: 95.63 LBS | TEMPERATURE: 98 F | SYSTOLIC BLOOD PRESSURE: 110 MMHG | BODY MASS INDEX: 17.6 KG/M2 | OXYGEN SATURATION: 96 % | DIASTOLIC BLOOD PRESSURE: 68 MMHG | HEART RATE: 80 BPM

## 2024-06-03 DIAGNOSIS — Z01.818 PRE-OP EXAM: ICD-10-CM

## 2024-06-03 DIAGNOSIS — Z01.818 PRE-OP EXAM: Primary | ICD-10-CM

## 2024-06-03 LAB
ALBUMIN SERPL-MCNC: 4.1 G/DL (ref 3.2–4.8)
ALBUMIN/GLOB SERPL: 1.6 {RATIO} (ref 1–2)
ALP LIVER SERPL-CCNC: 94 U/L
ALT SERPL-CCNC: 13 U/L
ANION GAP SERPL CALC-SCNC: 5 MMOL/L (ref 0–18)
APTT PPP: 26.3 SECONDS (ref 23–36)
AST SERPL-CCNC: 25 U/L (ref ?–34)
ATRIAL RATE: 68 BPM
BASOPHILS # BLD AUTO: 0.04 X10(3) UL (ref 0–0.2)
BASOPHILS NFR BLD AUTO: 0.8 %
BILIRUB SERPL-MCNC: 0.4 MG/DL (ref 0.2–1.1)
BILIRUB UR QL: NEGATIVE
BUN BLD-MCNC: 17 MG/DL (ref 9–23)
BUN/CREAT SERPL: 20 (ref 10–20)
CALCIUM BLD-MCNC: 9 MG/DL (ref 8.7–10.4)
CHLORIDE SERPL-SCNC: 108 MMOL/L (ref 98–112)
CLARITY UR: CLEAR
CO2 SERPL-SCNC: 27 MMOL/L (ref 21–32)
COLOR UR: YELLOW
CREAT BLD-MCNC: 0.85 MG/DL
DEPRECATED RDW RBC AUTO: 42.4 FL (ref 35.1–46.3)
EGFRCR SERPLBLD CKD-EPI 2021: 70 ML/MIN/1.73M2 (ref 60–?)
EOSINOPHIL # BLD AUTO: 0.06 X10(3) UL (ref 0–0.7)
EOSINOPHIL NFR BLD AUTO: 1.3 %
ERYTHROCYTE [DISTWIDTH] IN BLOOD BY AUTOMATED COUNT: 12.4 % (ref 11–15)
EST. AVERAGE GLUCOSE BLD GHB EST-MCNC: 111 MG/DL (ref 68–126)
FASTING STATUS PATIENT QL REPORTED: NO
GLOBULIN PLAS-MCNC: 2.5 G/DL (ref 2–3.5)
GLUCOSE BLD-MCNC: 133 MG/DL (ref 70–99)
GLUCOSE UR-MCNC: NORMAL MG/DL
HBA1C MFR BLD: 5.5 % (ref ?–5.7)
HCT VFR BLD AUTO: 39.4 %
HGB BLD-MCNC: 13.2 G/DL
HGB UR QL STRIP.AUTO: NEGATIVE
IMM GRANULOCYTES # BLD AUTO: 0.02 X10(3) UL (ref 0–1)
IMM GRANULOCYTES NFR BLD: 0.4 %
INR BLD: 0.91 (ref 0.8–1.2)
LEUKOCYTE ESTERASE UR QL STRIP.AUTO: NEGATIVE
LYMPHOCYTES # BLD AUTO: 1.01 X10(3) UL (ref 1–4)
LYMPHOCYTES NFR BLD AUTO: 21.1 %
MCH RBC QN AUTO: 31 PG (ref 26–34)
MCHC RBC AUTO-ENTMCNC: 33.5 G/DL (ref 31–37)
MCV RBC AUTO: 92.5 FL
MONOCYTES # BLD AUTO: 0.35 X10(3) UL (ref 0.1–1)
MONOCYTES NFR BLD AUTO: 7.3 %
NEUTROPHILS # BLD AUTO: 3.3 X10 (3) UL (ref 1.5–7.7)
NEUTROPHILS # BLD AUTO: 3.3 X10(3) UL (ref 1.5–7.7)
NEUTROPHILS NFR BLD AUTO: 69.1 %
NITRITE UR QL STRIP.AUTO: NEGATIVE
OSMOLALITY SERPL CALC.SUM OF ELEC: 293 MOSM/KG (ref 275–295)
P AXIS: 79 DEGREES
P-R INTERVAL: 176 MS
PH UR: 5.5 [PH] (ref 5–8)
PLATELET # BLD AUTO: 193 10(3)UL (ref 150–450)
POTASSIUM SERPL-SCNC: 4.3 MMOL/L (ref 3.5–5.1)
PROT SERPL-MCNC: 6.6 G/DL (ref 5.7–8.2)
PROT UR-MCNC: NEGATIVE MG/DL
PROTHROMBIN TIME: 12.8 SECONDS (ref 11.6–14.8)
Q-T INTERVAL: 420 MS
QRS DURATION: 70 MS
QTC CALCULATION (BEZET): 446 MS
R AXIS: 58 DEGREES
RBC # BLD AUTO: 4.26 X10(6)UL
SODIUM SERPL-SCNC: 140 MMOL/L (ref 136–145)
SP GR UR STRIP: 1.02 (ref 1–1.03)
T AXIS: 77 DEGREES
UROBILINOGEN UR STRIP-ACNC: NORMAL
VENTRICULAR RATE: 68 BPM
WBC # BLD AUTO: 4.8 X10(3) UL (ref 4–11)

## 2024-06-03 PROCEDURE — 36415 COLL VENOUS BLD VENIPUNCTURE: CPT

## 2024-06-03 PROCEDURE — 81003 URINALYSIS AUTO W/O SCOPE: CPT

## 2024-06-03 PROCEDURE — 85025 COMPLETE CBC W/AUTO DIFF WBC: CPT

## 2024-06-03 PROCEDURE — 80053 COMPREHEN METABOLIC PANEL: CPT

## 2024-06-03 PROCEDURE — 93005 ELECTROCARDIOGRAM TRACING: CPT

## 2024-06-03 PROCEDURE — 93010 ELECTROCARDIOGRAM REPORT: CPT | Performed by: INTERNAL MEDICINE

## 2024-06-03 PROCEDURE — 87081 CULTURE SCREEN ONLY: CPT

## 2024-06-03 PROCEDURE — 83036 HEMOGLOBIN GLYCOSYLATED A1C: CPT

## 2024-06-03 PROCEDURE — 71046 X-RAY EXAM CHEST 2 VIEWS: CPT | Performed by: INTERNAL MEDICINE

## 2024-06-03 PROCEDURE — 85610 PROTHROMBIN TIME: CPT

## 2024-06-03 PROCEDURE — 85730 THROMBOPLASTIN TIME PARTIAL: CPT

## 2024-06-03 RX ORDER — ATORVASTATIN CALCIUM 10 MG/1
10 TABLET, FILM COATED ORAL EVERY OTHER DAY
Qty: 45 TABLET | Refills: 3 | Status: SHIPPED | OUTPATIENT
Start: 2024-06-03

## 2024-06-03 NOTE — PROGRESS NOTES
Kaylyn Serra is a 78 year old female.  Chief Complaint   Patient presents with    Surgical/procedure Clearance     Right thumb carpometacarpal joint arthroplasty scheduled 6/24/24     HPI:   Kaylyn Serra is a 78 year old female who presents for a pre-op exam.    Plans to have R thumb carpometacarpal arthroplasty w/Dr. Gong on 6/24/24.    Able to ambulate multiple flights of stairs without CP or dyspnea. Walked 5 miles yesterday at [facility] without CP or dyspnea.    Denies CP, dyspnea, palpitations, dizziness, syncope, orthopnea, PND, LE edema.    Denies f/c, headache, nasal congestion, cough, abdominal pain, n/v/d, dysuria, new rashes.    Wt Readings from Last 6 Encounters:   06/03/24 95 lb 9.6 oz (43.4 kg)   04/22/24 94 lb (42.6 kg)   01/03/24 96 lb (43.5 kg)   09/26/22 93 lb (42.2 kg)   09/20/21 94 lb (42.6 kg)   09/03/21 100 lb (45.4 kg)     Body mass index is 17.49 kg/m².     Current Outpatient Medications   Medication Sig Dispense Refill    atorvastatin 10 MG Oral Tab Take 1 tablet (10 mg total) by mouth every other day. 45 tablet 3    Multiple Vitamin (THERA/BETA-CAROTENE) Oral Tab Take 1 tablet by mouth daily. 90 tablet 3    triamcinolone 0.1 % External Ointment Apply 1 Application topically 2 (two) times daily. 453.6 g 1    triamcinolone acetonide 0.1 % External Cream Apply topically 2 (two) times daily as needed. 1 Tube 0    Zolmitriptan 2.5 MG Oral Tab Take one tablet if needed for migraine 2 times a day. 18 tablet 3    docusate sodium 100 MG Oral Cap Take 1 capsule (100 mg total) by mouth daily as needed for constipation. 1 capsule 0    aspirin 81 MG Oral Tab Take 1 tablet (81 mg total) by mouth daily.      Multiple Vitamins-Minerals (BIOTIN PLUS/CALCIUM/VIT D3) Oral Tab Take by mouth. (Patient not taking: Reported on 6/3/2024)        Past Medical History:    Acute meniscal tear of left knee    Dr Gong-herminia shot and PT.     Arthritis    Diverticulosis    colonoscopy   [place]     Diverticulosis large intestine w/o perforation or abscess w/o bleeding    DJD (degenerative joint disease) of thoracic spine    MRI T spine in 1/15--right upper back pain resolved.     High cholesterol    Hives    testing per Dr. Hernandez--from fish oil.    Hyperlipidemia    Inflammatory arthritis    Internal hemorrhoids    colonoscopy Dr. Mesa     Left knee DJD    Lumbar degenerative disc disease    Xray lumbar--9/3/21-mod rotary dextroscoliosis of mid lumbar spine, disc space narrowing L1-2 L 4-5.    Migraines    MRI brain     Nodule of colon    Osteoarthritis    Osteopenia    dexa 2015    Phlegmon    hosp. 2012 for sigmoid divertiulitis with phlegmon    Pulsatile tinnitus    Left ear eval by Dr. Rivera.     PVC's (premature ventricular contractions)    echo and stress echo ok in     Rheumatic fever    S/P colonoscopic polypectomy    S/P left hemicolectomy    Sigmoid diverticulitis    recurrent; hosp.  -sigmoid resection       Past Surgical History:   Procedure Laterality Date    Cataract  2008    Cataract extraction Bilateral     Dr Mckeon    Colon surgery  2013    laparoscopic sigmoid resection-Dr Del Rosario for diverticulitis    Colonoscopy  2010    colonoscopy Dr. Mesa     Colonoscopy N/A 10/25/2018    Procedure: COLONOSCOPY;  Surgeon: Madie Morales MD;  Location: [facility] ENDOSCOPY    Colonoscopy  2024    Dr. George; diverticulosis, colon polyp, sigmoid anastomosis, lipoma    Colonoscopy N/A 2024    Procedure: COLONOSCOPY;  Surgeon: Preet George MD;  Location: [facility] ENDOSCOPY    Egd  2024    Dr. George; gastric erythema, hiatal hernia    Hernia surgery      Inguinal hernia repair  2014    repair of incarcerated incisional hernia L groin Dr. Del Rosario      1966    Other surgical history Right 2017    R thumb cyst surgery Dr. Jesus Tesfaye 3/2017-for ganglion cyst and bone spurs.    Tubal ligation  1986      Family History   Problem  Relation Age of Onset    Breast Cancer Mother 75    Lipids Mother     Lung Disorder Mother     Other (COPD) Mother          age 81    Other ( unknown cause age 60) Father     Diabetes Son       Social History:   Social History     Socioeconomic History    Marital status:    Tobacco Use    Smoking status: Never    Smokeless tobacco: Never   Substance and Sexual Activity    Alcohol use: No    Drug use: Never   Other Topics Concern    Caffeine Concern Yes     Comment: Coffee 1-3 cups daily; Tea    Reaction to local anesthetic No          REVIEW OF SYSTEMS:   GENERAL: feels well otherwise, denies f/c  HEENT: denies nasal congestion  LUNGS: denies shortness of breath with exertion, cough   CARDIOVASCULAR: denies chest pain, pressure, or palpitations  GI: denies abdominal pain, n/v/d, hematochezia, or melena  : denies dysuria, hematuria  NEURO: denies headaches, dizziness, focal deficits  EXT: denies LE edema      EXAM:   /68 (BP Location: Right arm, Patient Position: Sitting)   Pulse 80   Temp 98.3 °F (36.8 °C) (Oral)   Ht 5' 2\" (1.575 m)   Wt 95 lb 9.6 oz (43.4 kg)   SpO2 96%   BMI 17.49 kg/m²     GENERAL: well developed, well nourished, in no apparent distress  NECK: supple, no carotic bruits  LUNGS: clear to auscultation b/l, no w/r/r  CARDIO: RRR, normal S1S2, no m/r/g  GI: soft, NT, ND, NABS, no HSM  EXTREMITIES: no c/c/e +2 DP pulses bilaterally  NEURO: A&O x 3, moves all 4 extremities spontaneously      ASSESSMENT AND PLAN:   Kaylyn Serra is a 78 year old female who presents for a pre-op exam.    Pre-op exam  - RCRI: Score 0 Class I Risk  - denies CP or dyspnea with achieving >4METs  - risk stratification pending results of pre-operative testing as below:  - fax results of below testing to [phone] Attn: Joselin  - CBC W Differential W Platelet [E]; Future  - Comp Metabolic Panel (14) [E]; Future  - Prothrombin Time (PT) [E]; Future  - PTT, Activated [E]; Future  -  Urinalysis with Culture Reflex; Future  - MSSA and MRSA Culture Screen; Future  - EKG 12 Lead to be performed at [facility]; Future  - XR CHEST PA + LAT CHEST (CPT=71046); Future  - Hemoglobin A1C [E]; Future    RTC as previously scheduled or sooner PRN.    For E/M code - 30 minutes spent reviewing performing chart review, obtaining a history, performing a physical exam, reviewing the assessment/plan, placing orders, and completing documentation.     Hoa Cooper DO  6/3/2024  12:00 PM

## 2024-06-04 ENCOUNTER — TELEPHONE (OUTPATIENT)
Dept: INTERNAL MEDICINE CLINIC | Facility: CLINIC | Age: 79
End: 2024-06-04

## 2024-06-04 NOTE — TELEPHONE ENCOUNTER
Pre-op labs/UA, CXR, EKG and office note from 6/3 faxed to Dr. Melvin Navarro's office. Attn: Joselin     Fax #: [phone]   Fax confirmation received.

## 2024-06-04 NOTE — TELEPHONE ENCOUNTER
To nursing staff, please relay the following to Kaylyn Serra:    Pre-op labs, CXR, EKG all normal.     Thank you!

## 2024-06-04 NOTE — TELEPHONE ENCOUNTER
To nursing staff:    Please fax the pre-op labs/UA, CXR, EKG and office note from 6/3 to Dr. Melvin Navarro's office.    Fax #: [phone] Attn: Joselin.     Please see pre-op paperwork in outbox for further reference.    Thank you!

## 2024-06-20 NOTE — H&P
[facility]  part of [facility]    History & Physical    Kaylyn Serra Patient Status:  Hospital Outpatient Surgery    1945 MRN [id number]   Location [facility] OPERATING ROOM Attending Melvin Gong MD   Hosp Day # 0 GREGORY Cooper DO     Date:  2024  Date of Admission:  (Not on file)    History provided by:patient  Chief Complaint:   No chief complaint on file.    Right thumb and wrist pain  HPI:   Kaylyn Serra is a(n) 78 year old female. Presents for right thumb and wrist pain. She has been evaluated for this condition before. She had an injection in the past which only helped for about a week. She has noted worsening pain along the base of the thumb extending up to the forearm slightly.     History     Past Medical History:    Acute meniscal tear of left knee    Dr Gong-herminia shot and PT.     Arthritis    Diverticulosis    colonoscopy Dr. Mesa     Diverticulosis large intestine w/o perforation or abscess w/o bleeding    DJD (degenerative joint disease) of thoracic spine    MRI T spine in 1/15--right upper back pain resolved.     High cholesterol    Hives    testing per Dr. Hernandez--from fish oil.    Hx of motion sickness    Hyperlipidemia    Inflammatory arthritis    Internal hemorrhoids    colonoscopy Dr. Mesa     Left knee DJD    Lumbar degenerative disc disease    Xray lumbar--9/3/21-mod rotary dextroscoliosis of mid lumbar spine, disc space narrowing L1-2 L 4-5.    Migraines    MRI brain 2003    Nodule of colon    Osteoarthritis    Osteopenia    dexa 2015    Phlegmon    hosp. 2012 for sigmoid divertiulitis with phlegmon    Pulsatile tinnitus    Left ear eval by Dr. Rivera.     PVC's (premature ventricular contractions)    echo and stress echo ok in 2019    Rheumatic fever    S/P colonoscopic polypectomy    S/P left hemicolectomy    Sigmoid diverticulitis    recurrent; hosp. 2012 -sigmoid resection 2013    Visual impairment     readers     Past Surgical History:   Procedure Laterality Date    Cataract  2008    Cataract extraction Bilateral 2006    Dr Mckeon    Colon surgery  2013    laparoscopic sigmoid resection-Dr Del Rosario for diverticulitis    Colonoscopy  2010    colonoscopy Dr. Mesa     Colonoscopy N/A 10/25/2018    Procedure: COLONOSCOPY;  Surgeon: Madie Morales MD;  Location: [facility] ENDOSCOPY    Colonoscopy  2024    Dr. George; diverticulosis, colon polyp, sigmoid anastomosis, lipoma    Colonoscopy N/A 2024    Procedure: COLONOSCOPY;  Surgeon: Preet George MD;  Location: [facility] ENDOSCOPY    Egd  2024    Dr. George; gastric erythema, hiatal hernia    Hernia surgery      Inguinal hernia repair  2014    repair of incarcerated incisional hernia L groin Dr. Del Rosario      1966    Other surgical history Right 2017    R thumb cyst surgery Dr. Jesus Tesfaye 3/2017-for ganglion cyst and bone spurs.    Tubal ligation  1986     Family History   Problem Relation Age of Onset    Breast Cancer Mother 75    Lipids Mother     Lung Disorder Mother     Other (COPD) Mother          age 81    Other ( unknown cause age 60) Father     Diabetes Son      Social History:  Social History     Socioeconomic History    Marital status:    Tobacco Use    Smoking status: Never    Smokeless tobacco: Never   Vaping Use    Vaping status: Never Used   Substance and Sexual Activity    Alcohol use: No    Drug use: Never   Other Topics Concern    Caffeine Concern Yes     Comment: Coffee 1-3 cups daily; Tea    Reaction to local anesthetic No     Allergies/Medications:   Allergies:   Allergies   Allergen Reactions    Fish Oil HIVES     Fish oil caused hives (--testing per allergist).     Shellfish-Derived Products SWELLING     Facial swelling after shellfish as a teenager.    Sulfanilamide OTHER (SEE COMMENTS)     Other reaction(s): Tingling lips     No medications prior to admission.       Review of  Systems:   Pertinent items are noted in HPI.    Physical Exam:   Vital Signs:  Height 5' 2\" (1.575 m), weight 95 lb (43.1 kg).     General appearance: alert, appears stated age and cooperative  Extremities: Examination of the right wrist reveals tenderness at the thumb CMC joint. Thumb CMC grind test produces mild pain. Finkelstein maneuver is negative. She is mildly tender over the MCP joint index finger as well.   X-rays of right hand obtained in office. AP, lateral, and oblique views demonstrate advanced degenerative change at the thumb CMC joint. Changes have progressed over the last two years. There is also general changed of the index and middle finger MCP joint.   Pulses: 2+ and symmetric  Neurologic: Alert and oriented X 3, normal strength and tone. Normal symmetric reflexes. Normal coordination and gait    Cervical Papanicolaou to be done in MD's office    Results:     Lab Results   Component Value Date    WBC 4.8 06/03/2024    HGB 13.2 06/03/2024    HCT 39.4 06/03/2024    .0 06/03/2024    CREATSERUM 0.85 06/03/2024    BUN 17 06/03/2024     06/03/2024    K 4.3 06/03/2024     06/03/2024    CO2 27.0 06/03/2024     (H) 06/03/2024    CA 9.0 06/03/2024    ALB 4.1 06/03/2024    ALKPHO 94 06/03/2024    BILT 0.4 06/03/2024    TP 6.6 06/03/2024    AST 25 06/03/2024    ALT 13 06/03/2024    PTT 26.3 06/03/2024    INR 0.91 06/03/2024    T4F 0.95 04/12/2013    TSH 0.380 10/25/2023    MG 2.2 04/12/2013       No results found.        Assessment/Plan:     * No active hospital problems. *    Assessment: Right thumb CMC arthritis, progressive    Plan: I advised consideration of thumb CMC arthroplasty with trapeziectomy and ligament reconstruction. Risks and benefits of surgery were discussed. Questions were answered. No guarantees were made. She would like to proceed with surgery in the coming weeks.     I advised medical evaluation for risk stratification for surgery. Follow-up again on the date of  the procedure. I briefly discussed the postoperative recovery period and expected timeline. I would splint the thumb for one week postoperatively and begin range of motion and strength training immediately thereafter. She may benefit from the use of removable thumb spica splint in the deon-operative period. Follow up on the date of the procedure.    Follow up in office one week after surgery.     Elena Soriano PA-C  6/20/2024

## 2024-06-21 NOTE — DISCHARGE INSTRUCTIONS
HOME INSTRUCTIONS    Norco or tylenol for pain.  Ibuprofen for pain/inflammation.    Leave splint in place.    Keep dressing clean and dry.  Ice and elevate wrist.    Wear sling for comfort.    Follow up with Dr. Navarro/Elena Soriano in 1 week [phone].     [facility] HOME CARE INSTRUCTIONS: POST-OP ANESTHESIA  The medication that you received for sedation or general anesthesia can last up to 24 hours. Your judgment and reflexes may be altered, even if you feel like your normal self.      We Recommend:   Do not drive any motor vehicle or bicycle   Avoid mowing the lawn, playing sports, or working with power tools/applicances (power saws, electric knives or mixers)   That you have someone stay with you on your first night home   Do not drink alcohol or take sleeping pills or tranquilizers   Do not sign legal documents within 24 hours of your procedure   If you had a nerve block for your surgery, take extra care not to put any pressure on your arm or hand for 24 hours    It is normal:  For you to have a sore throat if you had a breathing tube during surgery (while you were asleep!). The sore throat should get better within 48 hours. You can gargle with warm salt water (1/2 tsp in 4 oz warm water) or use a throat lozenge for comfort  To feel muscle aches or soreness especially in the abdomen, chest or neck. The achy feeling should go away in the next 24 hours  To feel weak, sleepy or \"wiped out\". Your should start feeling better in the next 24 hours.   To experience mild discomforts such as sore lip or tongue, headache, cramps, gas pains or a bloated feeling in your abdomen.   To experience mild back pain or soreness for a day or two if you had spinal or epidural anesthesia.   If you had laparoscopic surgery, to feel shoulder pain or discomfort on the day of surgery.   For some patients to have nausea after surgery/anesthesia    If you feel nausea or experience vomiting:   Try to move around less.   Eat less than  usual or drink only liquids until the next morning   Nausea should resolve in about 24 hours    If you have a problem when you are at home:    Call your surgeons office   Discharge Instructions: After Your Surgery  You’ve just had surgery. During surgery, you were given medicine called anesthesia to keep you relaxed and free of pain. After surgery, you may have some pain or nausea. This is common. Here are some tips for feeling better and getting well after surgery.   Going home  Your healthcare provider will show you how to take care of yourself when you go home. They'll also answer your questions. Have an adult family member or friend drive you home. For the first 24 hours after your surgery:   Don't drive or use heavy equipment.  Don't make important decisions or sign legal papers.  Take medicines as directed.  Don't drink alcohol.  Have someone stay with you, if needed. They can watch for problems and help keep you safe.  Be sure to go to all follow-up visits with your healthcare provider. And rest after your surgery for as long as your provider tells you to.   Coping with pain  If you have pain after surgery, pain medicine will help you feel better. Take it as directed, before pain becomes severe. Also, ask your healthcare provider or pharmacist about other ways to control pain. This might be with heat, ice, or relaxation. And follow any other instructions your surgeon or nurse gives you.      Stay on schedule with your medicine.     Tips for taking pain medicine  To get the best relief possible, remember these points:   Pain medicines can upset your stomach. Taking them with a little food may help.  Most pain relievers taken by mouth need at least 20 to 30 minutes to start to work.  Don't wait till your pain becomes severe before you take your medicine. Try to time your medicine so that you can take it before starting an activity. This might be before you get dressed, go for a walk, or sit down for  dinner.  Constipation is a common side effect of some pain medicines. Call your healthcare provider before taking any medicines such as laxatives or stool softeners to help ease constipation. Also ask if you should skip any foods. Drinking lots of fluids and eating foods such as fruits and vegetables that are high in fiber can also help. Remember, don't take laxatives unless your surgeon has prescribed them.  Drinking alcohol and taking pain medicine can cause dizziness and slow your breathing. It can even be deadly. Don't drink alcohol while taking pain medicine.  Pain medicine can make you react more slowly to things. Don't drive or run machinery while taking pain medicine.  Your healthcare provider may tell you to take acetaminophen to help ease your pain. Ask them how much you're supposed to take each day. Acetaminophen or other pain relievers may interact with your prescription medicines or other over-the-counter (OTC) medicines. Some prescription medicines have acetaminophen and other ingredients in them. Using both prescription and OTC acetaminophen for pain can cause you to accidentally overdose. Read the labels on your OTC medicines with care. This will help you to clearly know the list of ingredients, how much to take, and any warnings. It may also help you not take too much acetaminophen. If you have questions or don't understand the information, ask your pharmacist or healthcare provider to explain it to you before you take the OTC medicine.   Managing nausea  Some people have an upset stomach (nausea) after surgery. This is often because of anesthesia, pain, or pain medicine, less movement of food in the stomach, or the stress of surgery. These tips will help you handle nausea and eat healthy foods as you get better. If you were on a special food plan before surgery, ask your healthcare provider if you should follow it while you get better. Check with your provider on how your eating should progress. It  may depend on the surgery you had. These general tips may help:   Don't push yourself to eat. Your body will tell you when to eat and how much.  Start off with clear liquids and soup. They're easier to digest.  Next try semi-solid foods as you feel ready. These include mashed potatoes, applesauce, and gelatin.  Slowly move to solid foods. Don’t eat fatty, rich, or spicy foods at first.  Don't force yourself to have 3 large meals a day. Instead eat smaller amounts more often.  Take pain medicines with a small amount of solid food, such as crackers or toast. This helps prevent nausea.  When to call your healthcare provider  Call your healthcare provider right away if you have any of these:   You still have too much pain, or the pain gets worse, after taking the medicine. The medicine may not be strong enough. Or there may be a complication from the surgery.  You feel too sleepy, dizzy, or groggy. The medicine may be too strong.  Side effects such as nausea or vomiting. Your healthcare provider may advise taking other medicines to .  Skin changes such as rash, itching, or hives. This may mean you have an allergic reaction. Your provider may advise taking other medicines.  The incision looks different (for instance, part of it opens up).  Bleeding or fluid leaking from the incision site, and weren't told to expect that.  Fever of 100.4°F (38°C) or higher, or as directed by your provider.  Call 911  Call 911 right away if you have:   Trouble breathing  Facial swelling    If you have obstructive sleep apnea   You were given anesthesia medicine during surgery to keep you comfortable and free of pain. After surgery, you may have more apnea spells because of this medicine and other medicines you were given. The spells may last longer than normal.    At home:  Keep using the continuous positive airway pressure (CPAP) device when you sleep. Unless your healthcare provider tells you not to, use it when you sleep, day or night.  CPAP is a common device used to treat obstructive sleep apnea.  Talk with your provider before taking any pain medicine, muscle relaxants, or sedatives. Your provider will tell you about the possible dangers of taking these medicines.  Contact your provider if your sleeping changes a lot even when taking medicines as directed.  Nora last reviewed this educational content on 10/1/2021  © 5734-4753 [employer]. All rights reserved. This information is not intended as a substitute for professional medical care. Always follow your healthcare professional's instructions.    Norco or tylenol for pain.  Ibuprofen for pain/inflammation.  Leave splint in place.  Keep dressing clean and dry.  Ice and elevate wrist.  Wear sling for comfort.  Follow up with Dr. Navarro/Elena Soriano in 1 week [phone].

## 2024-06-24 ENCOUNTER — ANESTHESIA EVENT (OUTPATIENT)
Dept: SURGERY | Facility: HOSPITAL | Age: 79
End: 2024-06-24

## 2024-06-24 ENCOUNTER — APPOINTMENT (OUTPATIENT)
Dept: GENERAL RADIOLOGY | Facility: HOSPITAL | Age: 79
End: 2024-06-24
Attending: ORTHOPAEDIC SURGERY

## 2024-06-24 ENCOUNTER — HOSPITAL ENCOUNTER (OUTPATIENT)
Facility: HOSPITAL | Age: 79
Setting detail: HOSPITAL OUTPATIENT SURGERY
Discharge: HOME | End: 2024-06-24
Attending: ORTHOPAEDIC SURGERY | Admitting: ORTHOPAEDIC SURGERY

## 2024-06-24 ENCOUNTER — ANESTHESIA (OUTPATIENT)
Dept: SURGERY | Facility: HOSPITAL | Age: 79
End: 2024-06-24

## 2024-06-24 VITALS
TEMPERATURE: 98 F | DIASTOLIC BLOOD PRESSURE: 61 MMHG | HEART RATE: 88 BPM | SYSTOLIC BLOOD PRESSURE: 133 MMHG | OXYGEN SATURATION: 94 % | HEIGHT: 62 IN | BODY MASS INDEX: 16.93 KG/M2 | WEIGHT: 92 LBS | RESPIRATION RATE: 16 BRPM

## 2024-06-24 DIAGNOSIS — M18.11 PRIMARY OSTEOARTHRITIS OF FIRST CARPOMETACARPAL JOINT OF RIGHT HAND: Primary | ICD-10-CM

## 2024-06-24 PROCEDURE — 76000 FLUOROSCOPY <1 HR PHYS/QHP: CPT | Performed by: ORTHOPAEDIC SURGERY

## 2024-06-24 PROCEDURE — 0RUS0KZ SUPPLEMENT RIGHT CARPOMETACARPAL JOINT WITH NONAUTOLOGOUS TISSUE SUBSTITUTE, OPEN APPROACH: ICD-10-PCS | Performed by: ORTHOPAEDIC SURGERY

## 2024-06-24 PROCEDURE — 0LB50ZZ EXCISION OF RIGHT LOWER ARM AND WRIST TENDON, OPEN APPROACH: ICD-10-PCS | Performed by: ORTHOPAEDIC SURGERY

## 2024-06-24 PROCEDURE — 0RUS07Z SUPPLEMENT RIGHT CARPOMETACARPAL JOINT WITH AUTOLOGOUS TISSUE SUBSTITUTE, OPEN APPROACH: ICD-10-PCS | Performed by: ORTHOPAEDIC SURGERY

## 2024-06-24 RX ORDER — HYDROMORPHONE HYDROCHLORIDE 1 MG/ML
0.4 INJECTION, SOLUTION INTRAMUSCULAR; INTRAVENOUS; SUBCUTANEOUS EVERY 5 MIN PRN
Status: DISCONTINUED | OUTPATIENT
Start: 2024-06-24 | End: 2024-06-24

## 2024-06-24 RX ORDER — OXYCODONE HCL 10 MG/1
10 TABLET, FILM COATED, EXTENDED RELEASE ORAL
Status: DISCONTINUED | OUTPATIENT
Start: 2024-06-25 | End: 2024-06-24

## 2024-06-24 RX ORDER — DEXAMETHASONE SODIUM PHOSPHATE 4 MG/ML
VIAL (ML) INJECTION AS NEEDED
Status: DISCONTINUED | OUTPATIENT
Start: 2024-06-24 | End: 2024-06-24 | Stop reason: SURG

## 2024-06-24 RX ORDER — HYDROMORPHONE HYDROCHLORIDE 1 MG/ML
0.6 INJECTION, SOLUTION INTRAMUSCULAR; INTRAVENOUS; SUBCUTANEOUS EVERY 5 MIN PRN
Status: DISCONTINUED | OUTPATIENT
Start: 2024-06-24 | End: 2024-06-24

## 2024-06-24 RX ORDER — MORPHINE SULFATE 4 MG/ML
4 INJECTION, SOLUTION INTRAMUSCULAR; INTRAVENOUS EVERY 10 MIN PRN
Status: DISCONTINUED | OUTPATIENT
Start: 2024-06-24 | End: 2024-06-24

## 2024-06-24 RX ORDER — SODIUM CHLORIDE, SODIUM LACTATE, POTASSIUM CHLORIDE, CALCIUM CHLORIDE 600; 310; 30; 20 MG/100ML; MG/100ML; MG/100ML; MG/100ML
INJECTION, SOLUTION INTRAVENOUS CONTINUOUS
Status: DISCONTINUED | OUTPATIENT
Start: 2024-06-24 | End: 2024-06-24

## 2024-06-24 RX ORDER — HYDROCODONE BITARTRATE AND ACETAMINOPHEN 5; 325 MG/1; MG/1
1 TABLET ORAL EVERY 4 HOURS PRN
Status: DISCONTINUED | OUTPATIENT
Start: 2024-06-24 | End: 2024-06-24

## 2024-06-24 RX ORDER — ONDANSETRON 2 MG/ML
INJECTION INTRAMUSCULAR; INTRAVENOUS AS NEEDED
Status: DISCONTINUED | OUTPATIENT
Start: 2024-06-24 | End: 2024-06-24 | Stop reason: SURG

## 2024-06-24 RX ORDER — MORPHINE SULFATE 10 MG/ML
6 INJECTION, SOLUTION INTRAMUSCULAR; INTRAVENOUS EVERY 10 MIN PRN
Status: DISCONTINUED | OUTPATIENT
Start: 2024-06-24 | End: 2024-06-24

## 2024-06-24 RX ORDER — NALOXONE HYDROCHLORIDE 0.4 MG/ML
0.08 INJECTION, SOLUTION INTRAMUSCULAR; INTRAVENOUS; SUBCUTANEOUS AS NEEDED
Status: DISCONTINUED | OUTPATIENT
Start: 2024-06-24 | End: 2024-06-24

## 2024-06-24 RX ORDER — HYDROCODONE BITARTRATE AND ACETAMINOPHEN 5; 325 MG/1; MG/1
1 TABLET ORAL EVERY 4 HOURS PRN
Qty: 10 TABLET | Refills: 0 | Status: SHIPPED | OUTPATIENT
Start: 2024-06-24

## 2024-06-24 RX ORDER — MORPHINE SULFATE 4 MG/ML
2 INJECTION, SOLUTION INTRAMUSCULAR; INTRAVENOUS EVERY 10 MIN PRN
Status: DISCONTINUED | OUTPATIENT
Start: 2024-06-24 | End: 2024-06-24

## 2024-06-24 RX ORDER — ONDANSETRON 2 MG/ML
4 INJECTION INTRAMUSCULAR; INTRAVENOUS ONCE
Status: COMPLETED | OUTPATIENT
Start: 2024-06-24 | End: 2024-06-24

## 2024-06-24 RX ORDER — HYDROMORPHONE HYDROCHLORIDE 1 MG/ML
0.2 INJECTION, SOLUTION INTRAMUSCULAR; INTRAVENOUS; SUBCUTANEOUS EVERY 5 MIN PRN
Status: DISCONTINUED | OUTPATIENT
Start: 2024-06-24 | End: 2024-06-24

## 2024-06-24 RX ORDER — ACETAMINOPHEN 500 MG
1000 TABLET ORAL ONCE
Status: COMPLETED | OUTPATIENT
Start: 2024-06-24 | End: 2024-06-24

## 2024-06-24 RX ADMIN — ONDANSETRON 4 MG: 2 INJECTION INTRAMUSCULAR; INTRAVENOUS at 13:30:00

## 2024-06-24 RX ADMIN — DEXAMETHASONE SODIUM PHOSPHATE 8 MG: 4 MG/ML VIAL (ML) INJECTION at 13:30:00

## 2024-06-24 NOTE — ANESTHESIA POSTPROCEDURE EVALUATION
Patient: Kaylyn Serra    Procedure Summary       Date: 06/24/24 Room / Location: [facility] MAIN OR 04 / [facility] MAIN OR    Anesthesia Start: 1308 Anesthesia Stop: 1445    Procedure: Right thumb carpometacarpal joint arthroplasty with trapeziectomy and ligament reconstruction (Right: Hand) Diagnosis: (M18.11 Right thumb osteoarthritis)    Surgeons: Melvin Navarro MD Anesthesiologist: Juve Parker MD, PhD    Anesthesia Type: general ASA Status: 2            Anesthesia Type: No value filed.    Vitals Value Taken Time   BP 98/74 06/24/24 1445   Temp 97.8 06/24/24 1445   Pulse 87 06/24/24 1444   Resp 18 06/24/24 1444   SpO2 95 % 06/24/24 1444   Vitals shown include unfiled device data.    [facility] AN Post Evaluation:   Patient Evaluated in PACU  Patient Participation: complete - patient participated  Level of Consciousness: awake and alert  Pain Score: 2  Pain Management: adequate  Airway Patency:patent  Yes    Nausea/Vomiting: none  Cardiovascular Status: acceptable  Respiratory Status: acceptable  Postoperative Hydration acceptable      Juve Parker MD, PhD  6/24/2024 2:45 PM

## 2024-06-24 NOTE — ANESTHESIA PROCEDURE NOTES
Airway  Date/Time: 6/24/2024 1:15 PM  Urgency: elective    Airway not difficult    General Information and Staff    Patient location during procedure: OR  Anesthesiologist: Juve Parker MD, PhD  Performed: anesthesiologist   Performed by: Juve Parker MD, PhD  Authorized by: Juve Parker MD, PhD      Indications and Patient Condition  Indications for airway management: anesthesia  Spontaneous ventilation: present  Sedation level: deep  Preoxygenated: yes  Patient position: sniffing  Mask difficulty assessment: 1 - vent by mask    Final Airway Details  Final airway type: supraglottic airway      Successful airway: classic  Size 3       Number of attempts at approach: 1

## 2024-06-24 NOTE — ANESTHESIA PREPROCEDURE EVALUATION
Anesthesia PreOp Note    HPI:     Kaylyn Serra is a 78 year old female who presents for preoperative consultation requested by: Melvin Gong MD    Date of Surgery: 6/24/2024    Procedure(s):  Right thumb carpometacarpal joint arthroplasty with trapeziectomy and ligament reconstruction  Indication: M18.11 Right thumb osteoarthritis    Relevant Problems   No relevant active problems       NPO:  Last Liquid Consumption Date: 06/23/24  Last Liquid Consumption Time: 1700  Last Solid Consumption Date: 06/23/24  Last Solid Consumption Time: 1700  Last Liquid Consumption Date: 06/23/24          History Review:  Patient Active Problem List    Diagnosis Date Noted    Hx of migraines 09/18/2020    Sensorineural hearing loss (SNHL) of both ears 08/30/2017    Hypercholesterolemia 08/30/2017    Primary osteoarthritis of left knee 08/30/2017    S/P laparoscopic-assisted sigmoidectomy 04/29/2013    Osteopenia 07/05/2011    Diverticulosis of colon 07/05/2011       Past Medical History:    Acute meniscal tear of left knee    Dr Gong-herminia shot and PT.     Arthritis    Diverticulosis    colonoscopy Dr. Mesa 2010    Diverticulosis large intestine w/o perforation or abscess w/o bleeding    DJD (degenerative joint disease) of thoracic spine    MRI T spine in 1/15--right upper back pain resolved.     High cholesterol    Hives    testing per Dr. Hernandez--from fish oil.    Hx of motion sickness    Hyperlipidemia    Inflammatory arthritis    Internal hemorrhoids    colonoscopy Dr. Mesa 2010    Left knee DJD    Lumbar degenerative disc disease    Xray lumbar--9/3/21-mod rotary dextroscoliosis of mid lumbar spine, disc space narrowing L1-2 L 4-5.    Migraines    MRI brain 2003    Nodule of colon    Osteoarthritis    Osteopenia    dexa 2015    Phlegmon    hosp. 8/2012 for sigmoid divertiulitis with phlegmon    Pulsatile tinnitus    Left ear eval by Dr. Rivera.     PVC's (premature ventricular contractions)    echo and stress  echo ok in     Rheumatic fever    S/P colonoscopic polypectomy    S/P left hemicolectomy    Sigmoid diverticulitis    recurrent; hosp. 2012 -sigmoid resection     Visual impairment    readers       Past Surgical History:   Procedure Laterality Date    Cataract  2008    Cataract extraction Bilateral 2006    Dr Mckeon    Colon surgery  2013    laparoscopic sigmoid resection-Dr Del Rosario for diverticulitis    Colonoscopy  2010    colonoscopy Dr. Mesa     Colonoscopy N/A 10/25/2018    Procedure: COLONOSCOPY;  Surgeon: Madie Morales MD;  Location: [facility] ENDOSCOPY    Colonoscopy  2024    Dr. George; diverticulosis, colon polyp, sigmoid anastomosis, lipoma    Colonoscopy N/A 2024    Procedure: COLONOSCOPY;  Surgeon: Preet George MD;  Location: [facility] ENDOSCOPY    Egd  2024    Dr. George; gastric erythema, hiatal hernia    Hernia surgery      Inguinal hernia repair  2014    repair of incarcerated incisional hernia L groin Dr. Del Rosario      1966    Other surgical history Right 2017    R thumb cyst surgery Dr. Jesus Tesfaye 3/2017-for ganglion cyst and bone spurs.    Tubal ligation  1986       Medications Prior to Admission   Medication Sig Dispense Refill Last Dose    atorvastatin 10 MG Oral Tab Take 1 tablet (10 mg total) by mouth every other day. 45 tablet 3 2024    Multiple Vitamin (THERA/BETA-CAROTENE) Oral Tab Take 1 tablet by mouth daily. 90 tablet 3 2024    triamcinolone 0.1 % External Ointment Apply 1 Application topically 2 (two) times daily. 453.6 g 1 Past Month    Zolmitriptan 2.5 MG Oral Tab Take one tablet if needed for migraine 2 times a day. 18 tablet 3     aspirin 81 MG Oral Tab Take 1 tablet (81 mg total) by mouth daily.   2024     Current Facility-Administered Medications Ordered in Epic   Medication Dose Route Frequency Provider Last Rate Last Admin    lactated ringers infusion   Intravenous Continuous Melvin Navarro MD 20 mL/hr at  24 1246 New Bag at 24 1246    [START ON 2024] ceFAZolin (Ancef) 2g in 10mL IV syringe premix  2 g Intravenous On Call to OR Elena Soriano PA-C        [START ON 2024] oxyCODONE ER (OxyCONTIN ER) 12 hr tab 10 mg  10 mg Oral 60 Min Pre-Op Elena Soriano PA-C         No current [facility]-ordered outpatient medications on file.       Allergies   Allergen Reactions    Fish Oil HIVES     Fish oil caused hives (2017--testing per allergist).     Shellfish-Derived Products SWELLING     Facial swelling after shellfish as a teenager.    Sulfanilamide OTHER (SEE COMMENTS)     Other reaction(s): Tingling lips       Family History   Problem Relation Age of Onset    Breast Cancer Mother 75    Lipids Mother     Lung Disorder Mother     Other (COPD) Mother          age 81    Other ( unknown cause age 60) Father     Diabetes Son      Social History     Socioeconomic History    Marital status:    Tobacco Use    Smoking status: Never    Smokeless tobacco: Never   Vaping Use    Vaping status: Never Used   Substance and Sexual Activity    Alcohol use: No    Drug use: Never   Other Topics Concern    Caffeine Concern Yes     Comment: Coffee 1-3 cups daily; Tea    Reaction to local anesthetic No       Available pre-op labs reviewed.  Lab Results   Component Value Date    WBC 4.8 2024    RBC 4.26 2024    HGB 13.2 2024    HCT 39.4 2024    MCV 92.5 2024    MCH 31.0 2024    MCHC 33.5 2024    RDW 12.4 2024    .0 2024     Lab Results   Component Value Date     2024    K 4.3 2024     2024    CO2 27.0 2024    BUN 17 2024    CREATSERUM 0.85 2024     (H) 2024    CA 9.0 2024     Lab Results   Component Value Date    INR 0.91 2024       Vital Signs:  Body mass index is 16.83 kg/m².   height is 1.575 m (5' 2\") and weight is 41.7 kg (92 lb). Her oral temperature is 97.4 °F (36.3 °C). Her blood  pressure is 125/70 and her pulse is 81. Her oxygen saturation is 96%.   Vitals:    06/14/24 1427 06/24/24 1231   BP:  125/70   Pulse:  81   Temp:  97.4 °F (36.3 °C)   TempSrc:  Oral   SpO2:  96%   Weight: 43.1 kg (95 lb) 41.7 kg (92 lb)   Height: 1.575 m (5' 2\") 1.575 m (5' 2\")        Anesthesia Evaluation     Patient summary reviewed and Nursing notes reviewed    Airway   Mallampati: II  Dental - Dentition appears grossly intact     Pulmonary - negative ROS and normal exam   Cardiovascular - negative ROS and normal exam  Exercise tolerance: good    Neuro/Psych - negative ROS     GI/Hepatic/Renal - negative ROS     Endo/Other - negative ROS   Abdominal  - normal exam                 Anesthesia Plan:   ASA:  2  Plan:   General  Airway:  ETT  Informed Consent Plan and Risks Discussed With:  Patient      I have informed Kaylyn Serra and/or legal guardian or family member of the nature of the anesthetic plan, benefits, risks including possible dental damage if relevant, major complications, and any alternative forms of anesthetic management.   All of the patient's questions were answered to the best of my ability. The patient desires the anesthetic management as planned.  Juve Parker MD, PhD  6/24/2024 12:50 PM  Present on Admission:  **None**

## 2024-06-24 NOTE — OPERATIVE REPORT
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

## 2024-06-25 NOTE — OPERATIVE REPORT
[facility]    PATIENT'S NAME: ALEX CORBETT   ATTENDING PHYSICIAN: Melvin Navarro MD   OPERATING PHYSICIAN: Melvin Navarro MD   PATIENT ACCOUNT#:   [id number]    LOCATION:  [facility] 5 [facility] 10  MEDICAL RECORD #:   [id number]       YOB: 1945  ADMISSION DATE:       06/24/2024      OPERATION DATE:  06/24/2024    OPERATIVE REPORT      PREOPERATIVE DIAGNOSIS:  Right thumb carpometacarpal joint arthritis.  POSTOPERATIVE DIAGNOSIS:  Right thumb carpometacarpal joint arthritis.  PROCEDURE:  Right thumb carpometacarpal joint arthroplasty with ligament reconstruction.    ASSISTANT:  Elena Soriano PA-C.    ANESTHESIA:  General.    COMPLICATIONS:  None.    BLOOD LOSS:  10 mL.    SPECIMEN:  Right thumb trapezium bone discarded.    INDICATIONS:  Patient is a 78-year-old female with history of progressive right thumb and wrist pain unresponsive to conservative care.  Preoperative physical findings and imaging studies were consistent with symptomatic thumb CMC arthritis.  She failed multiple injections, had persistent pain despite splinting and anti-inflammatory medications as well as activity modifications.  After discussion with the patient the risks and benefits of proceeding with operative treatment of the right thumb consisting of a CMC arthroplasty, she wished to proceed with surgery.    OPERATIVE TECHNIQUE:  The patient was identified in the preoperative holding area.  The appropriate consents were obtained.  She was taken to the operating room, placed in supine position on the operating room table.  After adequate general anesthesia was obtained, a tourniquet was placed on the right arm.  SCD devices were placed on both lower extremities.  The patient was given preoperative IV antibiotics.  Right wrist and hand were then prepped and draped in a sterile fashion.  A longitudinal incision was then made over the thumb CMC joint over the volar aspect of the joint.  Sharp dissection  was carried out down to the abductor pollicis longus.  Care was taken to avoid branches of the superficial radial nerve.  The interval between the abductor pollicis longus and the abductor pollicis brevis was developed, and the volar capsule overlying the thumb carpometacarpal joint was identified and incised longitudinally.  A [place] elevator was placed at the carpometacarpal joint as well as between the scaphoid trapezial joint.  An AP fluoroscopy imaging was obtained to confirm position.  The first dorsal extensor compartment was opened radially and decompressed.  Next, the trapezium bone was removed piecemeal with a rongeur and osteotome.  Complete resection of the trapezium was noted.  Next, a transverse incision was then made centered over the flexor carpi radialis tendon, approximately 8 cm proximal to the wrist flexion crease.  The tendon of the flexor carpi radialis was tenotomized and then brought out of the distal incision.  The tendon was then split longitudinally but left attached to the second metacarpal base.  An oblique drill hole was then made over the volar radial aspect of the first metacarpal.  The tendon was passed through this drill hole.  The second limb of the tendon was passed under the abductor pollicis longus, and the tendon was sutured upon itself with 4-0 nylon sutures.  This served as the ligament reconstruction portion of the CMC arthroplasty.  The remaining tendon was then rolled into an anchovy-type graft and sutured upon itself with 4-0 nylon sutures.  It was then placed in the defect created by the trapeziectomy, and the capsule was then sutured over the top with 3-0 Vicryl sutures in interrupted fashion.  Excellent stability of the joint was noted.  There was significant degenerative change of the thumb CMC joint.  The trapezoid bone appeared free of significant degenerative change.  The wound was copiously irrigated.  Tourniquet was deflated.  Attention was paid to hemostasis.   The skin and subcutaneous layers were closed in full thickness with 4-0 nylon sutures in interrupted fashion.  The forearm incision was also closed after hemostasis was achieved, 4-0 nylon sutures were used to close this incision.  A sterile dressing was applied as well as a forearm-based thumb spica splint.  The patient's anesthesia was reversed.  She was extubated and taken to the recovery room in stable condition.  All sponge and instrument counts were reported as correct.  The attending physician, Dr. Navarro, was present and performed all critical portions of the procedure.  There were no complications.  The first assistant was medically necessary for the surgery.  She assisted with patient positioning, retraction of soft tissues, suture management, hemostasis, and wound closure.  Without the aid of the assistant, the surgical procedure would not have been possible.    Dictated By Melvin Navarro MD  d: 06/24/2024 14:32:33  t: 06/25/2024 01:23:41  Job [id number]  DLO/

## 2024-10-18 ENCOUNTER — APPOINTMENT (OUTPATIENT)
Dept: MRI IMAGING | Facility: HOSPITAL | Age: 79
End: 2024-10-18
Attending: EMERGENCY MEDICINE
Payer: MEDICARE

## 2024-10-18 ENCOUNTER — APPOINTMENT (OUTPATIENT)
Dept: CT IMAGING | Facility: HOSPITAL | Age: 79
End: 2024-10-18
Attending: EMERGENCY MEDICINE
Payer: MEDICARE

## 2024-10-18 ENCOUNTER — HOSPITAL ENCOUNTER (EMERGENCY)
Facility: HOSPITAL | Age: 79
Discharge: HOME | End: 2024-10-18
Attending: EMERGENCY MEDICINE
Payer: MEDICARE

## 2024-10-18 VITALS
OXYGEN SATURATION: 94 % | TEMPERATURE: 98 F | RESPIRATION RATE: 16 BRPM | HEART RATE: 84 BPM | WEIGHT: 94.81 LBS | DIASTOLIC BLOOD PRESSURE: 68 MMHG | SYSTOLIC BLOOD PRESSURE: 123 MMHG | BODY MASS INDEX: 17 KG/M2

## 2024-10-18 DIAGNOSIS — R42 VERTIGO: Primary | ICD-10-CM

## 2024-10-18 LAB
ANION GAP SERPL CALC-SCNC: 7 MMOL/L (ref 0–18)
BASOPHILS # BLD AUTO: 0.03 X10(3) UL (ref 0–0.2)
BASOPHILS NFR BLD AUTO: 0.5 %
BILIRUB UR QL: NEGATIVE
BUN BLD-MCNC: 20 MG/DL (ref 9–23)
BUN/CREAT SERPL: 26 (ref 10–20)
CALCIUM BLD-MCNC: 9.8 MG/DL (ref 8.7–10.4)
CHLORIDE SERPL-SCNC: 105 MMOL/L (ref 98–112)
CLARITY UR: CLEAR
CO2 SERPL-SCNC: 28 MMOL/L (ref 21–32)
COLOR UR: COLORLESS
CREAT BLD-MCNC: 0.77 MG/DL
DEPRECATED RDW RBC AUTO: 40.7 FL (ref 35.1–46.3)
EGFRCR SERPLBLD CKD-EPI 2021: 78 ML/MIN/1.73M2 (ref 60–?)
EOSINOPHIL # BLD AUTO: 0.01 X10(3) UL (ref 0–0.7)
EOSINOPHIL NFR BLD AUTO: 0.2 %
ERYTHROCYTE [DISTWIDTH] IN BLOOD BY AUTOMATED COUNT: 11.9 % (ref 11–15)
ERYTHROCYTE [SEDIMENTATION RATE] IN BLOOD: 4 MM/HR
ERYTHROCYTE [SEDIMENTATION RATE] IN BLOOD: 9 MM/HR
GLUCOSE BLD-MCNC: 124 MG/DL (ref 70–99)
GLUCOSE BLDC GLUCOMTR-MCNC: 123 MG/DL (ref 70–99)
GLUCOSE UR-MCNC: NORMAL MG/DL
HCT VFR BLD AUTO: 40.6 %
HGB BLD-MCNC: 14.1 G/DL
HGB UR QL STRIP.AUTO: NEGATIVE
IMM GRANULOCYTES # BLD AUTO: 0.02 X10(3) UL (ref 0–1)
IMM GRANULOCYTES NFR BLD: 0.3 %
KETONES UR-MCNC: 20 MG/DL
LEUKOCYTE ESTERASE UR QL STRIP.AUTO: NEGATIVE
LYMPHOCYTES # BLD AUTO: 0.61 X10(3) UL (ref 1–4)
LYMPHOCYTES NFR BLD AUTO: 10 %
MCH RBC QN AUTO: 32.2 PG (ref 26–34)
MCHC RBC AUTO-ENTMCNC: 34.7 G/DL (ref 31–37)
MCV RBC AUTO: 92.7 FL
MONOCYTES # BLD AUTO: 0.22 X10(3) UL (ref 0.1–1)
MONOCYTES NFR BLD AUTO: 3.6 %
NEUTROPHILS # BLD AUTO: 5.24 X10 (3) UL (ref 1.5–7.7)
NEUTROPHILS # BLD AUTO: 5.24 X10(3) UL (ref 1.5–7.7)
NEUTROPHILS NFR BLD AUTO: 85.4 %
NITRITE UR QL STRIP.AUTO: NEGATIVE
OSMOLALITY SERPL CALC.SUM OF ELEC: 294 MOSM/KG (ref 275–295)
PH UR: 6.5 [PH] (ref 5–8)
PLATELET # BLD AUTO: 195 10(3)UL (ref 150–450)
POTASSIUM SERPL-SCNC: 4.2 MMOL/L (ref 3.5–5.1)
PROT UR-MCNC: NEGATIVE MG/DL
RBC # BLD AUTO: 4.38 X10(6)UL
SODIUM SERPL-SCNC: 140 MMOL/L (ref 136–145)
SP GR UR STRIP: >1.03 (ref 1–1.03)
TROPONIN I SERPL HS-MCNC: <3 NG/L
UROBILINOGEN UR STRIP-ACNC: NORMAL
WBC # BLD AUTO: 6.1 X10(3) UL (ref 4–11)

## 2024-10-18 PROCEDURE — 81003 URINALYSIS AUTO W/O SCOPE: CPT | Performed by: EMERGENCY MEDICINE

## 2024-10-18 PROCEDURE — 70498 CT ANGIOGRAPHY NECK: CPT | Performed by: EMERGENCY MEDICINE

## 2024-10-18 PROCEDURE — 84484 ASSAY OF TROPONIN QUANT: CPT | Performed by: EMERGENCY MEDICINE

## 2024-10-18 PROCEDURE — 99285 EMERGENCY DEPT VISIT HI MDM: CPT

## 2024-10-18 PROCEDURE — 85652 RBC SED RATE AUTOMATED: CPT | Performed by: EMERGENCY MEDICINE

## 2024-10-18 PROCEDURE — 82962 GLUCOSE BLOOD TEST: CPT

## 2024-10-18 PROCEDURE — 70496 CT ANGIOGRAPHY HEAD: CPT | Performed by: EMERGENCY MEDICINE

## 2024-10-18 PROCEDURE — 85025 COMPLETE CBC W/AUTO DIFF WBC: CPT | Performed by: EMERGENCY MEDICINE

## 2024-10-18 PROCEDURE — 70450 CT HEAD/BRAIN W/O DYE: CPT | Performed by: EMERGENCY MEDICINE

## 2024-10-18 PROCEDURE — 80048 BASIC METABOLIC PNL TOTAL CA: CPT | Performed by: EMERGENCY MEDICINE

## 2024-10-18 PROCEDURE — 70551 MRI BRAIN STEM W/O DYE: CPT | Performed by: EMERGENCY MEDICINE

## 2024-10-18 PROCEDURE — 96361 HYDRATE IV INFUSION ADD-ON: CPT

## 2024-10-18 PROCEDURE — 96374 THER/PROPH/DIAG INJ IV PUSH: CPT

## 2024-10-18 RX ORDER — MECLIZINE HYDROCHLORIDE 25 MG/1
25 TABLET ORAL 3 TIMES DAILY PRN
Qty: 21 TABLET | Refills: 0 | Status: SHIPPED | OUTPATIENT
Start: 2024-10-18

## 2024-10-18 RX ORDER — ONDANSETRON 4 MG/1
4 TABLET, ORALLY DISINTEGRATING ORAL EVERY 8 HOURS PRN
Qty: 10 TABLET | Refills: 0 | Status: SHIPPED | OUTPATIENT
Start: 2024-10-18 | End: 2024-10-25

## 2024-10-18 RX ORDER — ACETAMINOPHEN 325 MG/1
650 TABLET ORAL ONCE
Status: COMPLETED | OUTPATIENT
Start: 2024-10-18 | End: 2024-10-18

## 2024-10-18 RX ORDER — DIAZEPAM 10 MG/2ML
2.5 INJECTION, SOLUTION INTRAMUSCULAR; INTRAVENOUS ONCE
Status: COMPLETED | OUTPATIENT
Start: 2024-10-18 | End: 2024-10-18

## 2024-10-18 NOTE — ED PROVIDER NOTES
Patient Seen in: [facility] Emergency Department      History     Chief Complaint   Patient presents with    Numbness Weakness    Dizziness     Stated Complaint: vomiting    Subjective:   HPI      Patient presents to the emergency department complaining of vertigo and dizziness.  She states that she woke up at 1 AM with significant vertigo specifically turning her head to the right.  She describes some mild left eye discomfort and felt like the light was \"different in that side\".  She denies visual loss or blurry vision.  There is no neck pain or neck stiffness.  There is no other aggravating or alleviating factors.    Objective:     No pertinent past medical history.            No pertinent past surgical history.              No pertinent social history.                Physical Exam     ED Triage Vitals [10/18/24 1044]   /70   Pulse 69   Resp 18   Temp 97.5 °F (36.4 °C)   Temp src Temporal   SpO2 97 %   O2 Device None (Room air)       Current Vitals:   Vital Signs  BP: 123/68  Pulse: 84  Resp: 16  Temp: 97.5 °F (36.4 °C)  Temp src: Temporal  MAP (mmHg): 85    Oxygen Therapy  SpO2: 94 %  O2 Device: None (Room air)        Physical Exam  Vitals and nursing note reviewed.   Constitutional:       General: She is not in acute distress.     Appearance: She is well-developed.   HENT:      Head: Normocephalic.      Nose: Nose normal.      Mouth/Throat:      Mouth: Mucous membranes are moist.   Eyes:      General: No visual field deficit.     Intraocular pressure: Right eye pressure is 10 mmHg. Left eye pressure is 9 mmHg. Measurements were taken using an automated tonometer.     Extraocular Movements: Extraocular movements intact.      Right eye: Normal extraocular motion.      Left eye: Normal extraocular motion.      Conjunctiva/sclera: Conjunctivae normal.      Pupils: Pupils are equal, round, and reactive to light.   Cardiovascular:      Rate and Rhythm: Normal rate and regular rhythm.      Heart sounds:  No murmur heard.  Pulmonary:      Effort: Pulmonary effort is normal. No respiratory distress.      Breath sounds: Normal breath sounds.   Abdominal:      General: There is no distension.      Palpations: Abdomen is soft.      Tenderness: There is no abdominal tenderness.   Musculoskeletal:         General: No tenderness. Normal range of motion.      Cervical back: Normal range of motion and neck supple.   Skin:     General: Skin is warm and dry.      Findings: No rash.   Neurological:      Mental Status: She is alert and oriented to person, place, and time.      Cranial Nerves: No cranial nerve deficit, dysarthria or facial asymmetry.      Sensory: No sensory deficit.      Motor: No weakness.             ED Course     Labs Reviewed   CBC WITH DIFFERENTIAL WITH PLATELET - Abnormal; Notable for the following components:       Result Value    Lymphocyte Absolute 0.61 (*)     All other components within normal limits   BASIC METABOLIC PANEL (8) - Abnormal; Notable for the following components:    Glucose 124 (*)     BUN/CREA Ratio 26.0 (*)     All other components within normal limits   URINALYSIS, ROUTINE - Abnormal; Notable for the following components:    Urine Color Colorless (*)     Spec Gravity >1.030 (*)     Ketones Urine 20 (*)     All other components within normal limits   POCT GLUCOSE - Abnormal; Notable for the following components:    POC Glucose  123 (*)     All other components within normal limits   TROPONIN I HIGH SENSITIVITY - Normal   SED RATE, WESTERGREN (AUTOMATED)   SED RATE, WESTERGREN (AUTOMATED)                   MDM              Medical Decision Making  Differential diagnosis considered for labyrinthitis, peripheral vertigo, central vertigo, brain tumor.    Problems Addressed:  Vertigo: acute illness or injury    Amount and/or Complexity of Data Reviewed  Labs: ordered. Decision-making details documented in ED Course.     Details: Labs unremarkable with normal CBC and chemistry  panel  Radiology: ordered and independent interpretation performed. Decision-making details documented in ED Course.     Details: CT of the brain and CTA shows no evidence of occlusion or dissection.  MRI shows no evidence of stroke.  ECG/medicine tests: ordered and independent interpretation performed. Decision-making details documented in ED Course.  Discussion of management or test interpretation with external provider(s): Patient's symptoms improved with Valium.  Imaging all normal, suspect peripheral vertigo as source of dizziness and nausea.  Antivert and Zofran as needed.  Case was discussed with Dr. Nicolas of Neurology. STates that pt should continue ASA 81 mg and f/u with Ophthalmology.    Risk  Prescription drug management.  Parenteral controlled substances.        Disposition and Plan     Clinical Impression:  1. Vertigo         Disposition:  Discharge  10/18/2024  4:24 pm    Follow-up:  Hoa Cooper DO  [address] [place] [id number]  [phone]    Schedule an appointment as soon as possible for a visit      We recommend that you schedule follow up care with a primary care provider within the next three months to obtain basic health screening including reassessment of your blood pressure.      Medications Prescribed:  Current Discharge Medication List        START taking these medications    Details   meclizine 25 MG Oral Tab Take 1 tablet (25 mg total) by mouth 3 (three) times daily as needed for Dizziness.  Qty: 21 tablet, Refills: 0      ondansetron 4 MG Oral Tablet Dispersible Take 1 tablet (4 mg total) by mouth every 8 (eight) hours as needed.  Qty: 10 tablet, Refills: 0                 Supplementary Documentation:            TNK/ NI Documentation:    Date/Time last known well:   10/17/2024 9:00 PM    NIHSS on presentation: 0     Chief Complaint   Patient presents with    Numbness Weakness    Dizziness     IV Tenecteplase (TNK) administered: No; Patient is not a Candidate for IV TNK  due to: Not a stroke, peripheral vertigo    Candidate for Endovascular thrombectomy (EVT): No; Patient is not a candidate for Endovascular Thrombectomy due to: No large vessel occlusion ( LVO)  on CTA/MRA imaging      Disposition: There is no disposition on file for this visit.

## 2024-10-18 NOTE — ED INITIAL ASSESSMENT (HPI)
Reports vertigo since 1 am, she feels like the room is spinning. Numerous episodes of emesis overnight. Report she feels worse when laying down. Reports \"whoozy\" and unsteady. Reports she also felt right eye pain. Reports she felt normal around 9 pm. Denies any anticoagulant use. No hx of strokes.     Equal  strength to both sides, no facial droop or slur noted.     Stroke alert called at 1050

## 2024-10-28 ENCOUNTER — OFFICE VISIT (OUTPATIENT)
Dept: INTERNAL MEDICINE CLINIC | Facility: CLINIC | Age: 79
End: 2024-10-28

## 2024-10-28 ENCOUNTER — LAB ENCOUNTER (OUTPATIENT)
Dept: LAB | Age: 79
End: 2024-10-28
Attending: INTERNAL MEDICINE
Payer: MEDICARE

## 2024-10-28 VITALS
DIASTOLIC BLOOD PRESSURE: 72 MMHG | TEMPERATURE: 99 F | HEART RATE: 97 BPM | WEIGHT: 93.81 LBS | SYSTOLIC BLOOD PRESSURE: 120 MMHG | BODY MASS INDEX: 17.26 KG/M2 | HEIGHT: 62 IN | OXYGEN SATURATION: 98 %

## 2024-10-28 DIAGNOSIS — I34.0 MILD MITRAL REGURGITATION: ICD-10-CM

## 2024-10-28 DIAGNOSIS — Z12.31 ENCOUNTER FOR SCREENING MAMMOGRAM FOR MALIGNANT NEOPLASM OF BREAST: ICD-10-CM

## 2024-10-28 DIAGNOSIS — Z00.00 MEDICARE ANNUAL WELLNESS VISIT, SUBSEQUENT: ICD-10-CM

## 2024-10-28 DIAGNOSIS — Z00.00 MEDICARE ANNUAL WELLNESS VISIT, SUBSEQUENT: Primary | ICD-10-CM

## 2024-10-28 LAB
ALBUMIN SERPL-MCNC: 4.7 G/DL (ref 3.2–4.8)
ALP LIVER SERPL-CCNC: 99 U/L
ALT SERPL-CCNC: 15 U/L
AST SERPL-CCNC: 21 U/L (ref ?–34)
BILIRUB DIRECT SERPL-MCNC: 0.2 MG/DL (ref ?–0.3)
BILIRUB SERPL-MCNC: 0.6 MG/DL (ref 0.2–1.1)
CHOLEST SERPL-MCNC: 178 MG/DL (ref ?–200)
FASTING PATIENT LIPID ANSWER: YES
HDLC SERPL-MCNC: 61 MG/DL (ref 40–59)
LDLC SERPL CALC-MCNC: 103 MG/DL (ref ?–100)
NONHDLC SERPL-MCNC: 117 MG/DL (ref ?–130)
PROT SERPL-MCNC: 7.3 G/DL (ref 5.7–8.2)
T3FREE SERPL-MCNC: 3.23 PG/ML (ref 2.4–4.2)
T4 FREE SERPL-MCNC: 1.1 NG/DL (ref 0.8–1.7)
TRIGL SERPL-MCNC: 77 MG/DL (ref 30–149)
TSI SER-ACNC: 0.42 MIU/ML (ref 0.55–4.78)
VLDLC SERPL CALC-MCNC: 13 MG/DL (ref 0–30)

## 2024-10-28 PROCEDURE — 36415 COLL VENOUS BLD VENIPUNCTURE: CPT

## 2024-10-28 PROCEDURE — 84443 ASSAY THYROID STIM HORMONE: CPT

## 2024-10-28 PROCEDURE — 80076 HEPATIC FUNCTION PANEL: CPT

## 2024-10-28 PROCEDURE — 84439 ASSAY OF FREE THYROXINE: CPT

## 2024-10-28 PROCEDURE — 84481 FREE ASSAY (FT-3): CPT

## 2024-10-28 PROCEDURE — 80061 LIPID PANEL: CPT

## 2024-10-28 NOTE — PROGRESS NOTES
Subjective:   Kaylyn Serra is a 79 year old female who presents for a Subsequent Annual Wellness visit (Pt already had Initial Annual Wellness) and scheduled follow up of multiple significant but stable problems.     LOV 6/3/24.    Since, had surgery on hand with some improvement.    Had an episode of vertigo after a headache. First time she has ever had vertigo. Taken to ED, head imaging unremarkable.     Migraines - Has zomig 2.5 mg - not needed - original rx 11/2012 Dr. Urbano. Takes daily Aspirin.     HLD - tolerating Lipitor 10 mg at bedtime QOD.    Takes biotin for hair growth, hasn't taken in 2 weeks in preparation of lab tests, is fasting today.     She has 2 grandchildren, now 3 great-grandchildren, newest is Ryleigh.     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    History/Other:   Fall Risk Assessment:   She has been screened for Falls and is low risk.      Cognitive Assessment:   She had a completely normal cognitive assessment - see flowsheet entries     Functional Ability/Status:   Kaylyn Serra has a completely normal functional assessment. See flowsheet for details.    Depression Screening (PHQ):  PHQ-2 SCORE: 0  , done 10/21/2024   Last [place] Suicide Screening on 10/28/2024 was No Risk.     5 minutes spent screening and counseling for depression    Advanced Directives:   She does have a Living Will but we do NOT have it on file in Epic.    She does have a POA but we do NOT have it on file in [facility].    Patient has Advance Care Planning documents present in EMR. Reviewed documents with patient (and family/surrogate if present).      Patient Active Problem List   Diagnosis    S/P laparoscopic-assisted sigmoidectomy    Osteopenia    Diverticulosis of colon    Sensorineural hearing loss (SNHL) of both ears    Hypercholesterolemia    Primary  osteoarthritis of left knee    Hx of migraines     Allergies:  She is allergic to fish oil, shellfish-derived products, and sulfanilamide.    Current Medications:  Outpatient Medications Marked as Taking for the 10/28/24 encounter (Office Visit) with Hoa Cooper, DO   Medication Sig    meclizine 25 MG Oral Tab Take 1 tablet (25 mg total) by mouth 3 (three) times daily as needed for Dizziness.    atorvastatin 10 MG Oral Tab Take 1 tablet (10 mg total) by mouth every other day.    Multiple Vitamin (THERA/BETA-CAROTENE) Oral Tab Take 1 tablet by mouth daily.    triamcinolone 0.1 % External Ointment Apply 1 Application topically 2 (two) times daily. (Patient taking differently: Apply 1 Application  topically as needed.)    Zolmitriptan 2.5 MG Oral Tab Take one tablet if needed for migraine 2 times a day.    aspirin 81 MG Oral Tab Take 1 tablet (81 mg total) by mouth daily.       Medical History:  She  has a past medical history of Acute meniscal tear of left knee (2016), Arthritis, Diverticulosis (2010), Diverticulosis large intestine w/o perforation or abscess w/o bleeding (10/25/2018), DJD (degenerative joint disease) of thoracic spine (2015), High cholesterol, Hives (2017), motion sickness, Hyperlipidemia, Inflammatory arthritis, Internal hemorrhoids (2010), Left knee DJD, Lumbar degenerative disc disease, Migraines, Nodule of colon (10/25/2018), Osteoarthritis, Osteopenia, Phlegmon (2012), Pulsatile tinnitus (01/2017), PVC's (premature ventricular contractions) (2019), Rheumatic fever (1952), S/P colonoscopic polypectomy (10/25/2018), S/P left hemicolectomy (10/25/2018), Sigmoid diverticulitis (2012 and 2013), and Visual impairment.  Surgical History:  She  has a past surgical history that includes colonoscopy (01/2010); Colon surgery (04/2013); Inguinal hernia repair (11/2014); Cataract extraction (Bilateral, 2006); other surgical history (Right, 03/2017); colonoscopy (N/A, 10/25/2018); tubal ligation (1986);  egd (2024); colonoscopy (2024); colonoscopy (N/A, 2024); cataract (); hernia surgery ();  (); and other surgical history (Right, 2024).   Family History:  Her family history includes Breast Cancer (age of onset: 75) in her mother; COPD in her mother; Diabetes in her son; Lipids in her mother; Lung Disorder in her mother;  unknown cause age 60 in her father.  Social History:  She  reports that she has never smoked. She has never used smokeless tobacco. She reports that she does not drink alcohol and does not use drugs.    Tobacco:  She has never smoked tobacco.    CAGE Alcohol Screen:   CAGE screening score of 0 on 10/21/2024, showing low risk of alcohol abuse.      Patient Care Team:  Hoa Cooper DO as PCP - General (Internal Medicine)  Jesus Tesfaye MD (SURGERY, PLASTIC)  Sharmaine Kasper OT as Occupational Therapist (Occupational Therapist)    Review of Systems  GENERAL: feels well otherwise  SKIN: denies any unusual skin lesions  EYES: denies blurred vision or double vision  HEENT: denies nasal congestion, sinus pain or ST  LUNGS: denies shortness of breath with exertion  CARDIOVASCULAR: denies chest pain on exertion  GI: denies abdominal pain, denies heartburn  : denies dysuria, hematuria  MUSCULOSKELETAL: denies back pain  NEURO: denies headaches  PSYCHE: denies depression     Objective:   Physical Exam  General Appearance:  Alert, cooperative, no distress, appears stated age   Head:  Normocephalic, without obvious abnormality, atraumatic   Eyes:  PERRL, conjunctiva/corneas clear, EOM's intact both eyes   Ears:  Normal TM's and external ear canals, both ears   Nose: Nares normal, septum midline,mucosa normal, no drainage or sinus tenderness   Throat: Lips, mucosa, and tongue normal; teeth and gums normal   Neck: Supple, symmetrical, trachea midline, no adenopathy;  thyroid: not enlarged, symmetric, no tenderness/mass/nodules; no carotid bruit or JVD    Breast:   No palpable masses, skin dimpling, nipple inversion, or axillary LAD b/l   Lungs:   Clear to auscultation bilaterally, respirations unlabored   Heart:  Regular rate and rhythm, S1 and S2 normal, no murmur, rub, or gallop   Abdomen:   Soft, non-tender, bowel sounds active all four quadrants,  no masses, no organomegaly   Pelvic: Deferred   Extremities: Extremities normal, atraumatic, no cyanosis or edema   Pulses: 2+ and symmetric   Skin: Skin color, texture, turgor normal, no rashes or lesions   Lymph nodes: Cervical, supraclavicular, and axillary nodes normal   Neurologic: Normal       /72   Pulse 97   Temp 98.5 °F (36.9 °C) (Oral)   Ht 5' 2\" (1.575 m)   Wt 93 lb 12.8 oz (42.5 kg)   LMP  (LMP Unknown)   SpO2 98%   Breastfeeding No   BMI 17.16 kg/m²  Estimated body mass index is 17.16 kg/m² as calculated from the following:    Height as of this encounter: 5' 2\" (1.575 m).    Weight as of this encounter: 93 lb 12.8 oz (42.5 kg).    Medicare Hearing Assessment:   Hearing Screening    Time taken: 10/28/2024  8:32 AM  Entry User: Deepthi Bynum RN  Screening Method: Questionnaire  I have a problem hearing over the telephone: No I have trouble following the conversations when two or more people are talking at the same time: No   I have trouble understanding things on the TV: No I have to strain to understand conversations: No   I have to worry about missing the telephone ring or doorbell: No I have trouble hearing conversations in a noisy background such as a crowded room or restaurant: Yes   I get confused about where sounds come from: No I misunderstand some words in a sentence and need to ask people to repeat themselves: No   I especially have trouble understanding the speech of women and children: No I have trouble understanding the speaker in a large room such as at a meeting or place of Zoroastrian: No   Many people I talk to seem to mumble (or don't speak clearly): No People get annoyed  because I misunderstand what they say: No   I misunderstand what others are saying and make inappropriate responses: No I avoid social activities because I cannot hear well and fear I will reply improperly: No   Family members and friends have told me they think I may have hearing loss: No             Visual Acuity:   Right Eye Visual Acuity: Uncorrected Right Eye Chart Acuity: 20/25   Left Eye Visual Acuity: Uncorrected Left Eye Chart Acuity: 20/25   Both Eyes Visual Acuity: Uncorrected Both Eyes Chart Acuity: 20/25   Able To Tolerate Visual Acuity: Yes        Assessment & Plan:   Kaylyn Serra is a 79 year old female who presents for a Medicare Assessment.     Medicare annual wellness visit, subsequent (Primary)  - cbc, bmp reviewed 10/18/24  -     Lipid Panel; Future; Expected date: 10/28/2024  -     TSH W Reflex To Free T4; Future; Expected date: 10/28/2024  -     Hepatic Function Panel (7); Future; Expected date: 10/28/2024    Encounter for screening mammogram for malignant neoplasm of breast  -     [facility] NAV 2D+3D SCREENING BILAT (CPT=77067/33507); Future; Expected date: 10/28/2024    Mild mitral regurgitation  -     CARD ECHO 2D DOPPLER (CPT=93306); Future; Expected date: 10/28/2024    Hypercholesterolemia  - on Lipitor 10 mg QOD (cut from daily in 9/2016 b/c low TG 16).    - LDL 69 on 10/25/23     PVCs  - hx EKG 9/16/19 sinus rhythm w frequent PVCs  - TTE 10/4/19- EF 65%, ok  - stress echo 10/31/19 normal     Constipation chronic  - Miralax PRN per Dr. Mesa  - Also takes colace PRN   - has bowel movements daily or every other day     Migraines hx  - Zomig 2.5 mg bid PRN (original Rx Dr. Urbano).   - Does not take rarely     Osteopenia  - DEXA 2/6/15 w/osteopenia  - recommend calcium and vit D weight bearing exercises  - declines further dexas      Hearing loss  - Audiogram 1/2017 Dr. Rivera: mild to mod sensorineural hearing loss in both ears.   - No tx at present.      Diverticulosis c/b  diverticulitis  - s/p laparoscopic-assisted sigmoidectomy for diverticulitis in 2013  - keep fiber in diet     Primary osteoarthritis L knee  - Saw Dr. Navarro for gel shots      Hx pulsatile tinnitus of left ear   - Neg eval by Dr. Rivera in 2017  - mild, doesn't bother her, no tx     Healthcare Maintenance  Cancer Screenings:  - Breast: 11/3/22 ok, repeat ordered today 10/18/24  - Cervical: per Dr. Zafar. Pap 2/5/21 here neg. Saw Dr Zafar 3/16/21.   - Colon: last colonoscopy 4/24/24 w/Dr. George, no further screening colonoscopies recommended  - Lung: never smoker     Vaccines:  - Tdap: 8/31/16  - Shingles: shingrix x2 2019, zostavax 9/28/09  - Pneumonia: prevnar 9/11/15, pneumovax 8/3/17, prevnar 20  - Flu: UTD 9/28/23  - COVID-19: x7, UTD (booster on 9/28/23)     Misc:  - DEXA: 2/2015 osteopenia, declines further  - Advanced directives: discussed, in Epic    Labs ordered as above. Informed patient to expect messaging only if the labs are abnormal via patient preferred method of communication (phone calls).    Overall 60 minutes was spent on this encounter. 45 minutes spent reviewing, providing care coordination, and documentation of the acute/chronic conditions as listed above. 15 minutes was spent reviewing elements of a AWV and providing age appropriate screenings.      RTC in 1 year or sooner PRN.      The patient indicates understanding of these issues and agrees to the plan.    Reinforced healthy diet, lifestyle, and exercise.      No follow-ups on file.     Hoa Cooper DO, 10/28/2024     Supplementary Documentation:   General Health:  In the past six months, have you lost more than 10 pounds without trying?: (Patient-Rptd) 2 - No  Has your appetite been poor?: (Patient-Rptd) Yes  Type of Diet: (Patient-Rptd) Vegetarian  How does the patient maintain a good energy level?: (Patient-Rptd) Appropriate Exercise;Daily Walks;Stretching;Other  How would you describe your daily physical activity?:  (Patient-Rptd) Moderate  How would you describe your current health state?: (Patient-Rptd) Good  How do you maintain positive mental well-being?: (Patient-Rptd) Social Interaction;Puzzles  On a scale of 0 to 10, with 0 being no pain and 10 being severe pain, what is your pain level?: (Patient-Rptd) 1 - (Mild)  In the past six months, have you experienced urine leakage?: (Patient-Rptd) 0-No  At any time do you feel concerned for the safety/well-being of yourself and/or your children, in your home or elsewhere?: No  Have you had any immunizations at another office such as Influenza, Hepatitis B, Tetanus, or Pneumococcal?: (Patient-Rptd) Yes    Health Maintenance   Topic Date Due    MA Annual Health Assessment  01/01/2024    Annual Depression Screening  01/01/2024    Influenza Vaccine  Completed    DEXA Scan  Completed    Fall Risk Screening (Annual)  Completed    Pneumococcal Vaccine: 65+ Years  Completed    Zoster Vaccines  Completed    COVID-19 Vaccine  Completed    Colorectal Cancer Screening  Discontinued

## 2024-11-01 ENCOUNTER — HOSPITAL ENCOUNTER (OUTPATIENT)
Dept: CV DIAGNOSTICS | Facility: HOSPITAL | Age: 79
Discharge: HOME | End: 2024-11-01
Attending: INTERNAL MEDICINE
Payer: MEDICARE

## 2024-11-01 ENCOUNTER — TELEPHONE (OUTPATIENT)
Dept: INTERNAL MEDICINE CLINIC | Facility: CLINIC | Age: 79
End: 2024-11-01

## 2024-11-01 DIAGNOSIS — I34.0 MILD MITRAL REGURGITATION: ICD-10-CM

## 2024-11-01 DIAGNOSIS — Z00.00 MEDICARE ANNUAL WELLNESS VISIT, SUBSEQUENT: Primary | ICD-10-CM

## 2024-11-01 DIAGNOSIS — E78.00 HYPERCHOLESTEROLEMIA: ICD-10-CM

## 2024-11-01 PROCEDURE — 93306 TTE W/DOPPLER COMPLETE: CPT | Performed by: INTERNAL MEDICINE

## 2024-11-01 NOTE — TELEPHONE ENCOUNTER
Called pt w/results of labs, plan to increase atorvastatin to daily and repeat labs in 3 months. Pt verbalized understanding and agreed to plan. All questions were answered.

## 2024-11-02 ENCOUNTER — PATIENT MESSAGE (OUTPATIENT)
Dept: INTERNAL MEDICINE CLINIC | Facility: CLINIC | Age: 79
End: 2024-11-02

## 2024-11-04 NOTE — TELEPHONE ENCOUNTER
Updated Immunization Record; Able to pull details through Query via [employer]

## 2024-11-05 ENCOUNTER — HOSPITAL ENCOUNTER (OUTPATIENT)
Dept: MAMMOGRAPHY | Facility: HOSPITAL | Age: 79
Discharge: HOME | End: 2024-11-05
Attending: INTERNAL MEDICINE
Payer: MEDICARE

## 2024-11-05 DIAGNOSIS — Z12.31 ENCOUNTER FOR SCREENING MAMMOGRAM FOR MALIGNANT NEOPLASM OF BREAST: ICD-10-CM

## 2024-11-05 PROCEDURE — 77067 SCR MAMMO BI INCL CAD: CPT | Performed by: INTERNAL MEDICINE

## 2024-11-05 PROCEDURE — 77063 BREAST TOMOSYNTHESIS BI: CPT | Performed by: INTERNAL MEDICINE

## 2025-02-01 ENCOUNTER — PATIENT MESSAGE (OUTPATIENT)
Dept: INTERNAL MEDICINE CLINIC | Facility: CLINIC | Age: 80
End: 2025-02-01

## 2025-02-01 DIAGNOSIS — M79.10 MYALGIA: Primary | ICD-10-CM

## 2025-02-03 ENCOUNTER — LAB ENCOUNTER (OUTPATIENT)
Dept: LAB | Age: 80
End: 2025-02-03
Attending: INTERNAL MEDICINE
Payer: MEDICARE

## 2025-02-03 DIAGNOSIS — M79.10 MYALGIA: ICD-10-CM

## 2025-02-03 DIAGNOSIS — E78.00 HYPERCHOLESTEROLEMIA: ICD-10-CM

## 2025-02-03 LAB
ALBUMIN SERPL-MCNC: 4.7 G/DL (ref 3.2–4.8)
ALP LIVER SERPL-CCNC: 100 U/L
ALT SERPL-CCNC: 13 U/L
AST SERPL-CCNC: 20 U/L (ref ?–34)
BILIRUB DIRECT SERPL-MCNC: 0.2 MG/DL (ref ?–0.3)
BILIRUB SERPL-MCNC: 0.5 MG/DL (ref 0.2–1.1)
CHOLEST SERPL-MCNC: 156 MG/DL (ref ?–200)
CK SERPL-CCNC: 48 U/L
FASTING PATIENT LIPID ANSWER: YES
HDLC SERPL-MCNC: 61 MG/DL (ref 40–59)
LDLC SERPL CALC-MCNC: 79 MG/DL (ref ?–100)
NONHDLC SERPL-MCNC: 95 MG/DL (ref ?–130)
PROT SERPL-MCNC: 7.1 G/DL (ref 5.7–8.2)
TRIGL SERPL-MCNC: 85 MG/DL (ref 30–149)
VLDLC SERPL CALC-MCNC: 13 MG/DL (ref 0–30)

## 2025-02-03 PROCEDURE — 80076 HEPATIC FUNCTION PANEL: CPT

## 2025-02-03 PROCEDURE — 80061 LIPID PANEL: CPT

## 2025-02-03 PROCEDURE — 82550 ASSAY OF CK (CPK): CPT

## 2025-02-03 PROCEDURE — 36415 COLL VENOUS BLD VENIPUNCTURE: CPT

## 2025-02-12 ENCOUNTER — TELEPHONE (OUTPATIENT)
Dept: INTERNAL MEDICINE CLINIC | Facility: CLINIC | Age: 80
End: 2025-02-12

## 2025-03-12 NOTE — ED QUICK NOTES
LVM with MRI for eta   PT SENT MESSAGE THAT SHE WANTS TO DO THE FORESIGHT AND PREQUEL. SO I WILL GET THAT READY FOR HER TO /MP

## 2025-04-03 RX ORDER — ATORVASTATIN CALCIUM 10 MG/1
10 TABLET, FILM COATED ORAL EVERY OTHER DAY
Qty: 45 TABLET | Refills: 3 | Status: SHIPPED | OUTPATIENT
Start: 2025-04-03

## 2025-04-03 NOTE — TELEPHONE ENCOUNTER
Refill request is for a maintenance medication and has met the criteria specified in the Ambulatory Medication Refill Standing Order for eligibility, visits, laboratory, alerts and was sent to the requested pharmacy.    Requested Prescriptions     Signed Prescriptions Disp Refills    atorvastatin 10 MG Oral Tab 45 tablet 3     Sig: Take 1 tablet (10 mg total) by mouth every other day     Authorizing Provider: BELINDA GRIER     Ordering User: EVERETT SYED

## 2025-04-10 NOTE — TELEPHONE ENCOUNTER
Called patient and relayed DR. GASTON message - verbalized understanding NUTRITION FOLLOW UP NOTE    PATIENT SEEN FOR: follow up     SOURCE: [x] Patient  [x] Current Medical Record  [] RN  [] Family/support person at bedside  [] Patient unavailable/inappropriate  [] Other:    CHART REVIEWED/EVENTS NOTED.  [] No changes to nutrition care plan to note  [x] Nutrition Status:  -Acute decompensated heart failure  -DM2    DIET ORDER:   Diet, Consistent Carbohydrate w/Evening Snack:   1500mL Fluid Restriction ([id number])  Low Sodium  Supplement Feeding Modality:  Oral  Suplena Cans or Servings Per Day:  2       Frequency:  Daily (25)    CURRENT DIET ORDER IS:  [x] Appropriate:  [] Inadequate:  [] Other:    NUTRITION INTAKE/PROVISION:  [x] PO: adequate per pt   [] Enteral Nutrition:  [] Parenteral Nutrition:    ANTHROPOMETRICS:  Drug Dosing Weight  Height (cm): 167.6 (2025 09:07)  Weight (kg): 55.792 (2025 09:07)  BMI (kg/m2): 19.9 (2025 09:07)  BSA (m2): 1.63 (2025 09:07)  Weights:   Daily Weight in k.3 (04-10), Weight in k.2 (), Weight in k.4 (), Weight in k (), Weight in k.3 (), Weight in k.3 (), Weight in k.4 ()     MEDICATIONS:  MEDICATIONS  (STANDING):  AQUAPHOR (petrolatum Ointment) 1 Application(s) Topical two times a day  aspirin  chewable 81 milliGRAM(s) Oral daily  buMETAnide Infusion 2 mG/Hr (10 mL/Hr) IV Continuous <Continuous>  dextrose 5%. 1000 milliLiter(s) (100 mL/Hr) IV Continuous <Continuous>  dextrose 5%. 1000 milliLiter(s) (50 mL/Hr) IV Continuous <Continuous>  dextrose 50% Injectable 25 Gram(s) IV Push once  dextrose 50% Injectable 12.5 Gram(s) IV Push once  dextrose 50% Injectable 25 Gram(s) IV Push once  glucagon  Injectable 1 milliGRAM(s) IntraMuscular once  hydrALAZINE 100 milliGRAM(s) Oral three times a day  insulin glargine Injectable (LANTUS) 9 Unit(s) SubCutaneous at bedtime  insulin lispro (ADMELOG) corrective regimen sliding scale   SubCutaneous three times a day before meals  insulin lispro (ADMELOG) corrective regimen sliding scale   SubCutaneous at bedtime  insulin lispro Injectable (ADMELOG) 6 Unit(s) SubCutaneous three times a day before meals  isosorbide   dinitrate Tablet (ISORDIL) 30 milliGRAM(s) Oral three times a day  metoprolol succinate ER 75 milliGRAM(s) Oral daily  Nephro-daniel 1 Tablet(s) Oral daily  ranolazine 1000 milliGRAM(s) Oral two times a day  rivaroxaban 15 milliGRAM(s) Oral with dinner  spironolactone 25 milliGRAM(s) Oral two times a day    MEDICATIONS  (PRN):  acetaminophen     Tablet .. 650 milliGRAM(s) Oral every 6 hours PRN Temp greater or equal to 38C (100.4F), Mild Pain (1 - 3)  aluminum hydroxide/magnesium hydroxide/simethicone Suspension 30 milliLiter(s) Oral every 4 hours PRN Dyspepsia  dextrose Oral Gel 15 Gram(s) Oral once PRN Blood Glucose LESS THAN 70 milliGRAM(s)/deciliter  melatonin 3 milliGRAM(s) Oral at bedtime PRN Insomnia  ondansetron Injectable 4 milliGRAM(s) IV Push every 8 hours PRN Nausea and/or Vomiting    NUTRITIONALLY PERTINENT LABS:   Na128 mmol/L[L] Glu 286 mg/dL[H] K+ 4.6 mmol/L Cr  2.80 mg/dL[H] BUN 81 mg/dL[H]  Chol 116 mg/dL LDL --    HDL 46 mg/dL Trig 83 mg/dL  25 @ 07:01 a1c 8.6    A1C with Estimated Average Glucose Result: 8.6 % (25 @ 07:01)  A1C with Estimated Average Glucose Result: 6.5 % (24 @ 06:21)  A1C with Estimated Average Glucose Result: 6.4 % (24 @ 06:44)    Finger Sticks:  POCT Blood Glucose.: 100 mg/dL (04-10 @ 08:56)  POCT Blood Glucose.: 81 mg/dL (04-09 @ 21:04)  POCT Blood Glucose.: 374 mg/dL ( @ 17:27)  POCT Blood Glucose.: 366 mg/dL ( @ 12:40)    NUTRITIONALLY PERTINENT MEDICATIONS/LABS:  [x] Reviewed  [x] Relevant notes on medications/labs:  -Hgb A1c noted. blood glucose being monitored. insulin regimen noted to maintain glycemic control.   -hyponatremia noted, magnesium elevated     EDEMA:  [x] Reviewed  [] Relevant notes:    GI/ I&O:  [x] Reviewed  [] Relevant notes:  [] Other:    SKIN:   [] No pressure injuries documented, per nursing flowsheet  [] Pressure injury previously noted  [] Change in pressure injury documentation:  [] Other:    ESTIMATED NEEDS:  [] No change:  [] Updated:  Energy:   kcal/day (xx-xx kcal/kg)  Protein:   g/day (xx-xx g/kg)  Fluid:   ml/day or [] defer to team  Based on:    NUTRITION DIAGNOSIS:  [] Prior Dx:  [] New Dx:    EDUCATION:  [] Yes:  [] Not appropriate/warranted    NUTRITION CARE PLAN:  1. Diet:  2. Supplements:  3. Multivitamin/mineral supplementation:  4:     [] Achieved - Continue current nutrition intervention(s)  [] Current medical condition precludes nutrition intervention at this time.    MONITORING AND EVALUATION:   RD remains available upon request and will follow up per protocol.    Name  Available on [facility] NUTRITION FOLLOW UP NOTE    PATIENT SEEN FOR: follow up     SOURCE: [x] Patient  [x] Current Medical Record  [] RN  [] Family/support person at bedside  [] Patient unavailable/inappropriate  [] Other:    CHART REVIEWED/EVENTS NOTED.  [] No changes to nutrition care plan to note  [x] Nutrition Status:  -Acute decompensated heart failure  -DM2    DIET ORDER:   Diet, Consistent Carbohydrate w/Evening Snack:   1500mL Fluid Restriction ([id number])  Low Sodium  Supplement Feeding Modality:  Oral  Suplena Cans or Servings Per Day:  2       Frequency:  Daily (25)    CURRENT DIET ORDER IS:  [x] Appropriate:  [] Inadequate:  [] Other:    NUTRITION INTAKE/PROVISION:  [x] PO: adequate per pt   [] Enteral Nutrition:  [] Parenteral Nutrition:    ANTHROPOMETRICS:  Drug Dosing Weight  Height (cm): 167.6 (2025 09:07)  Weight (kg): 55.792 (2025 09:07)  BMI (kg/m2): 19.9 (2025 09:07)  BSA (m2): 1.63 (2025 09:07)  Weights:   Daily Weight in k.3 (04-10), Weight in k.2 (), Weight in k.4 (), Weight in k (), Weight in k.3 (), Weight in k.3 (), Weight in k.4 ()     MEDICATIONS:  MEDICATIONS  (STANDING):  AQUAPHOR (petrolatum Ointment) 1 Application(s) Topical two times a day  aspirin  chewable 81 milliGRAM(s) Oral daily  buMETAnide Infusion 2 mG/Hr (10 mL/Hr) IV Continuous <Continuous>  dextrose 5%. 1000 milliLiter(s) (100 mL/Hr) IV Continuous <Continuous>  dextrose 5%. 1000 milliLiter(s) (50 mL/Hr) IV Continuous <Continuous>  dextrose 50% Injectable 25 Gram(s) IV Push once  dextrose 50% Injectable 12.5 Gram(s) IV Push once  dextrose 50% Injectable 25 Gram(s) IV Push once  glucagon  Injectable 1 milliGRAM(s) IntraMuscular once  hydrALAZINE 100 milliGRAM(s) Oral three times a day  insulin glargine Injectable (LANTUS) 9 Unit(s) SubCutaneous at bedtime  insulin lispro (ADMELOG) corrective regimen sliding scale   SubCutaneous three times a day before meals  insulin lispro (ADMELOG) corrective regimen sliding scale   SubCutaneous at bedtime  insulin lispro Injectable (ADMELOG) 6 Unit(s) SubCutaneous three times a day before meals  isosorbide   dinitrate Tablet (ISORDIL) 30 milliGRAM(s) Oral three times a day  metoprolol succinate ER 75 milliGRAM(s) Oral daily  Nephro-daniel 1 Tablet(s) Oral daily  ranolazine 1000 milliGRAM(s) Oral two times a day  rivaroxaban 15 milliGRAM(s) Oral with dinner  spironolactone 25 milliGRAM(s) Oral two times a day    MEDICATIONS  (PRN):  acetaminophen     Tablet .. 650 milliGRAM(s) Oral every 6 hours PRN Temp greater or equal to 38C (100.4F), Mild Pain (1 - 3)  aluminum hydroxide/magnesium hydroxide/simethicone Suspension 30 milliLiter(s) Oral every 4 hours PRN Dyspepsia  dextrose Oral Gel 15 Gram(s) Oral once PRN Blood Glucose LESS THAN 70 milliGRAM(s)/deciliter  melatonin 3 milliGRAM(s) Oral at bedtime PRN Insomnia  ondansetron Injectable 4 milliGRAM(s) IV Push every 8 hours PRN Nausea and/or Vomiting    NUTRITIONALLY PERTINENT LABS:   Na128 mmol/L[L] Glu 286 mg/dL[H] K+ 4.6 mmol/L Cr  2.80 mg/dL[H] BUN 81 mg/dL[H]  Chol 116 mg/dL LDL --    HDL 46 mg/dL Trig 83 mg/dL  25 @ 07:01 a1c 8.6    A1C with Estimated Average Glucose Result: 8.6 % (25 @ 07:01)  A1C with Estimated Average Glucose Result: 6.5 % (24 @ 06:21)  A1C with Estimated Average Glucose Result: 6.4 % (24 @ 06:44)    Finger Sticks:  POCT Blood Glucose.: 100 mg/dL (04-10 @ 08:56)  POCT Blood Glucose.: 81 mg/dL (04-09 @ 21:04)  POCT Blood Glucose.: 374 mg/dL ( @ 17:27)  POCT Blood Glucose.: 366 mg/dL ( @ 12:40)    NUTRITIONALLY PERTINENT MEDICATIONS/LABS:  [x] Reviewed  [x] Relevant notes on medications/labs:  -Hgb A1c noted. blood glucose being monitored. insulin regimen noted to maintain glycemic control.   -hyponatremia noted, magnesium elevated     EDEMA:  [x] Reviewed  [] Relevant notes:    GI/ I&O:  [x] Reviewed  [x] Relevant notes: constipation per pt   [] Other:    SKIN:   per flow sheets:  raven buttocks sacrum suspected deep tissue injury     ESTIMATED NEEDS:  Based on: dosing weight 55.8kg with consideration for increased needs for wound healing, stable CKD  27-32kcal/kg 1506-1785kcal/day  1.2-1.4g/kg 67-78g protein/day  defer fluid needs to team    NUTRITION DIAGNOSIS:  [x] Prior Dx: severe acute malnutrition, increased nutrient needs  [] New Dx:    EDUCATION:  [x] Yes: PO intake encouraged, emphasis suplena for added protein for wound healing. DM diet education reinforced.   [] Not appropriate/warranted    NUTRITION CARE PLAN:  1. Diet: continue fluid restriction per team due to hyponatremia. continue restrictions as ordered. d/c prunes due to elevated blood glucose levels.  2. Supplements: can continue suplena   3. Multivitamin/mineral supplementation: continue as ordered to aid in wound healing   4: Consider addition of bowel regimen if remains with constipation.     [] Achieved - Continue current nutrition intervention(s)  [] Current medical condition precludes nutrition intervention at this time.    MONITORING AND EVALUATION:   RD remains available upon request and will follow up per protocol.    Name  Available on [facility] TE[facility] NUTRITION FOLLOW UP NOTE    PATIENT SEEN FOR: follow up     SOURCE: [x] Patient  [x] Current Medical Record  [] RN  [] Family/support person at bedside  [] Patient unavailable/inappropriate  [] Other:    CHART REVIEWED/EVENTS NOTED.  [] No changes to nutrition care plan to note  [x] Nutrition Status:  -Acute decompensated heart failure  -DM2    DIET ORDER:   Diet, Consistent Carbohydrate w/Evening Snack:   1500mL Fluid Restriction ([id number])  Low Sodium  Supplement Feeding Modality:  Oral  Suplena Cans or Servings Per Day:  2       Frequency:  Daily (25)    CURRENT DIET ORDER IS:  [x] Appropriate:  [] Inadequate:  [] Other:    NUTRITION INTAKE/PROVISION:  [x] PO: adequate per pt   [] Enteral Nutrition:  [] Parenteral Nutrition:    ANTHROPOMETRICS:  Drug Dosing Weight  Height (cm): 167.6 (2025 09:07)  Weight (kg): 55.792 (2025 09:07)  BMI (kg/m2): 19.9 (2025 09:07)  BSA (m2): 1.63 (2025 09:07)  Weights:   Daily Weight in k.3 (04-10), Weight in k.2 (), Weight in k.4 (), Weight in k (), Weight in k.3 (), Weight in k.3 (), Weight in k.4 ()     MEDICATIONS:  MEDICATIONS  (STANDING):  AQUAPHOR (petrolatum Ointment) 1 Application(s) Topical two times a day  aspirin  chewable 81 milliGRAM(s) Oral daily  buMETAnide Infusion 2 mG/Hr (10 mL/Hr) IV Continuous <Continuous>  dextrose 5%. 1000 milliLiter(s) (100 mL/Hr) IV Continuous <Continuous>  dextrose 5%. 1000 milliLiter(s) (50 mL/Hr) IV Continuous <Continuous>  dextrose 50% Injectable 25 Gram(s) IV Push once  dextrose 50% Injectable 12.5 Gram(s) IV Push once  dextrose 50% Injectable 25 Gram(s) IV Push once  glucagon  Injectable 1 milliGRAM(s) IntraMuscular once  hydrALAZINE 100 milliGRAM(s) Oral three times a day  insulin glargine Injectable (LANTUS) 9 Unit(s) SubCutaneous at bedtime  insulin lispro (ADMELOG) corrective regimen sliding scale   SubCutaneous three times a day before meals  insulin lispro (ADMELOG) corrective regimen sliding scale   SubCutaneous at bedtime  insulin lispro Injectable (ADMELOG) 6 Unit(s) SubCutaneous three times a day before meals  isosorbide   dinitrate Tablet (ISORDIL) 30 milliGRAM(s) Oral three times a day  metoprolol succinate ER 75 milliGRAM(s) Oral daily  Nephro-daniel 1 Tablet(s) Oral daily  ranolazine 1000 milliGRAM(s) Oral two times a day  rivaroxaban 15 milliGRAM(s) Oral with dinner  spironolactone 25 milliGRAM(s) Oral two times a day    MEDICATIONS  (PRN):  acetaminophen     Tablet .. 650 milliGRAM(s) Oral every 6 hours PRN Temp greater or equal to 38C (100.4F), Mild Pain (1 - 3)  aluminum hydroxide/magnesium hydroxide/simethicone Suspension 30 milliLiter(s) Oral every 4 hours PRN Dyspepsia  dextrose Oral Gel 15 Gram(s) Oral once PRN Blood Glucose LESS THAN 70 milliGRAM(s)/deciliter  melatonin 3 milliGRAM(s) Oral at bedtime PRN Insomnia  ondansetron Injectable 4 milliGRAM(s) IV Push every 8 hours PRN Nausea and/or Vomiting    NUTRITIONALLY PERTINENT LABS:   Na128 mmol/L[L] Glu 286 mg/dL[H] K+ 4.6 mmol/L Cr  2.80 mg/dL[H] BUN 81 mg/dL[H]  Chol 116 mg/dL LDL --    HDL 46 mg/dL Trig 83 mg/dL  25 @ 07:01 a1c 8.6    A1C with Estimated Average Glucose Result: 8.6 % (25 @ 07:01)  A1C with Estimated Average Glucose Result: 6.5 % (24 @ 06:21)  A1C with Estimated Average Glucose Result: 6.4 % (24 @ 06:44)    Finger Sticks:  POCT Blood Glucose.: 100 mg/dL (04-10 @ 08:56)  POCT Blood Glucose.: 81 mg/dL (04-09 @ 21:04)  POCT Blood Glucose.: 374 mg/dL ( @ 17:27)  POCT Blood Glucose.: 366 mg/dL ( @ 12:40)    NUTRITIONALLY PERTINENT MEDICATIONS/LABS:  [x] Reviewed  [x] Relevant notes on medications/labs:  -Hgb A1c noted. blood glucose being monitored. insulin regimen noted to maintain glycemic control.   -hyponatremia noted, magnesium elevated     EDEMA:  [x] Reviewed  [] Relevant notes:    GI/ I&O:  [x] Reviewed  [x] Relevant notes: constipation per pt   [] Other:    SKIN:   per flow sheets:  raven buttocks sacrum suspected deep tissue injury     ESTIMATED NEEDS:  Based on: dosing weight 55.8kg with consideration for increased needs for wound healing, stable CKD  27-32kcal/kg 1506-1785kcal/day  1.2-1.4g/kg 67-78g protein/day  defer fluid needs to team    NUTRITION DIAGNOSIS:  [x] Prior Dx: severe acute malnutrition, increased nutrient needs  [] New Dx:    EDUCATION:  [x] Yes: PO intake encouraged, emphasis suplena for added protein for wound healing. DM, heart failure diet education reinforced.   [] Not appropriate/warranted    NUTRITION CARE PLAN:  1. Diet: continue fluid restriction per team due to hyponatremia. continue restrictions as ordered. d/c prunes due to elevated blood glucose levels.  2. Supplements: can continue suplena   3. Multivitamin/mineral supplementation: continue as ordered to aid in wound healing   4: Consider addition of bowel regimen if remains with constipation.     [] Achieved - Continue current nutrition intervention(s)  [] Current medical condition precludes nutrition intervention at this time.    MONITORING AND EVALUATION:   RD remains available upon request and will follow up per protocol.    Name  Available on [facility] TEAMS

## 2025-06-21 ENCOUNTER — OFFICE VISIT (OUTPATIENT)
Dept: INTERNAL MEDICINE CLINIC | Facility: CLINIC | Age: 80
End: 2025-06-21

## 2025-06-21 VITALS
WEIGHT: 95.5 LBS | BODY MASS INDEX: 17.57 KG/M2 | DIASTOLIC BLOOD PRESSURE: 82 MMHG | HEIGHT: 62 IN | HEART RATE: 76 BPM | SYSTOLIC BLOOD PRESSURE: 122 MMHG | OXYGEN SATURATION: 93 %

## 2025-06-21 DIAGNOSIS — R63.0 DECREASED APPETITE: ICD-10-CM

## 2025-06-21 DIAGNOSIS — Z86.69 HX OF MIGRAINES: ICD-10-CM

## 2025-06-21 DIAGNOSIS — E78.00 HYPERCHOLESTEROLEMIA: Primary | ICD-10-CM

## 2025-06-21 DIAGNOSIS — Z87.19 HISTORY OF DIVERTICULOSIS: ICD-10-CM

## 2025-06-21 DIAGNOSIS — R43.0 ANOSMIA: ICD-10-CM

## 2025-06-21 DIAGNOSIS — K64.9 HEMORRHOIDS, UNSPECIFIED HEMORRHOID TYPE: ICD-10-CM

## 2025-06-21 DIAGNOSIS — Z90.49 S/P LAPAROSCOPIC-ASSISTED SIGMOIDECTOMY: ICD-10-CM

## 2025-06-21 DIAGNOSIS — Z12.31 ENCOUNTER FOR SCREENING MAMMOGRAM FOR BREAST CANCER: ICD-10-CM

## 2025-06-21 DIAGNOSIS — I49.3 PVC'S (PREMATURE VENTRICULAR CONTRACTIONS): ICD-10-CM

## 2025-06-21 DIAGNOSIS — M85.80 OSTEOPENIA, UNSPECIFIED LOCATION: ICD-10-CM

## 2025-06-21 DIAGNOSIS — K59.00 CONSTIPATION, UNSPECIFIED CONSTIPATION TYPE: ICD-10-CM

## 2025-06-21 DIAGNOSIS — H90.3 SENSORINEURAL HEARING LOSS (SNHL) OF BOTH EARS: ICD-10-CM

## 2025-06-21 DIAGNOSIS — M17.12 PRIMARY OSTEOARTHRITIS OF LEFT KNEE: ICD-10-CM

## 2025-06-21 PROCEDURE — 3074F SYST BP LT 130 MM HG: CPT | Performed by: INTERNAL MEDICINE

## 2025-06-21 PROCEDURE — 1126F AMNT PAIN NOTED NONE PRSNT: CPT | Performed by: INTERNAL MEDICINE

## 2025-06-21 PROCEDURE — 3008F BODY MASS INDEX DOCD: CPT | Performed by: INTERNAL MEDICINE

## 2025-06-21 PROCEDURE — 3079F DIAST BP 80-89 MM HG: CPT | Performed by: INTERNAL MEDICINE

## 2025-06-21 PROCEDURE — 1160F RVW MEDS BY RX/DR IN RCRD: CPT | Performed by: INTERNAL MEDICINE

## 2025-06-21 PROCEDURE — 1159F MED LIST DOCD IN RCRD: CPT | Performed by: INTERNAL MEDICINE

## 2025-06-21 PROCEDURE — 99214 OFFICE O/P EST MOD 30 MIN: CPT | Performed by: INTERNAL MEDICINE

## 2025-06-21 NOTE — ASSESSMENT & PLAN NOTE
Knee pain and hand arthritis present. Previous thumb surgery was successful. Ibuprofen provides relief. No current need for further intervention unless disability increases. Surgery is an option if disability becomes significant, but occupational therapy and joint injections are alternatives.  - Continue ibuprofen as needed  - Consider occupational therapy if symptoms worsen  Orders:    CBC With Differential With Platelet; Future    Comp Metabolic Panel (14); Future    Lipid Panel; Future    TSH W Reflex To Free T4; Future    Vitamin D; Future

## 2025-06-21 NOTE — PATIENT INSTRUCTIONS
No medication changes today.     Please follow-up with me as scheduled for your annual visit in October 2025.    Please have fasting labs done 2 to 3 days prior to that visit.    Please schedule your mammogram for after 11/5/2025.    Can consider following up with an ear nose and throat specialist for the changes in perception of taste and smell.    Can consider occupational therapy for the hands if needed.

## 2025-06-21 NOTE — PROGRESS NOTES
The following individual(s), Kaylyn Serra, verbally consents to be recorded using ambient AI listening technology and understand that they can each withdraw their consent to this listening technology at any point by asking the clinician to turn off or pause the recording:

## 2025-06-21 NOTE — PROGRESS NOTES
Go to Kaylyn Serra is a 79 year old female with complaints of:  Chief Complaint: [facility] (Former pt of Dr. Cooper)    HPI     Kaylyn Serra is a(n) 79 year old female with a history of migraines, hyperlipidemia, who presents to [facility].     Was seen in the ER on 10/18/2024 for vertigo, has not recurred.  Head imaging at that time unremarkable. She had ascribed this to melatonin.     History of migraines.  Has zolmitraiptan 2.5 mg as needed, has not needed it recently.  It was initially prescribed by Dr. Chester of neurology on 11/2020 12.  Does take a daily aspirin.     Has a history of hyperlipidemia, continues on atorvastatin 10 mg every other day. More frequent dosing causes muscle pain. Her cholesterol levels were acceptable in February.    Patient has a history of PVCs, noted on EKG done 9/16/2019 with sinus rhythm but frequent PVCs; TTE done 10/4/2019 with an EF of 65%.  Stress echo done 2019 was also within normal limits.    Patient has a history of diverticulosis, complicated by diverticulitis status post laparoscopic sigmoidectomy for diverticulitis in 2013.  Last colonoscopy done 4/24/2024 with Dr. George, no further screening recommended.  She has a history of constipation, which she is managing by upping her fiber intake, in the form of fruit.  She has hemorrhoids, which were noted on her last colonoscopy.  Occasionally, she has a small amount of blood on the tissue when she wipes after a bowel movement.    Patient has a history of osteopenia, with a DEXA scan done 2/6/2015 noted to have osteopenia.  However, patient declines further DEXA scans.  She continues on vitamin D, calcium, and continues with doing weightbearing exercises.    Patient has had an audiogram in the past, in 2017, noted to have mild to moderate sensorineural hearing loss.  Also has tinnitus in the left ear.    She mentions a lack of appetite, which she attributes to possible post-COVID effects,  having had COVID three times. She experiences changes in taste and smell, sometimes finding food repulsive or tasting bitter, and occasionally perceives phantom odors like cigarette smoke.  She has had an endoscopy done, which was reportedly within normal limits.    Patient has a history of osteoarthritis, has required gel shots in the knee.  She also has arthritis in her hands. Ibuprofen provides pain relief. Her thumb surgery two summers ago was successful, but she now experiences arthritis in her other thumb and fingers.    Declines further DEXA's.  Last mammogram done 11/5/2024, BI-RADS 1.  Aged out of Pap smears, last done 2/5/2021 by Dr. Zafar.         Past Medical History     Past Medical History[1]     Past Surgical History     Past Surgical History[2]     Family History     Family History[3]    Social History     Short Social Hx on File[4]    Allergies     Allergies[5]    Current Medications     Current Outpatient Medications   Medication Sig Dispense Refill    aspirin 81 MG Oral Tab Take 1 tablet (81 mg total) by mouth daily.      atorvastatin 10 MG Oral Tab Take 1 tablet (10 mg total) by mouth every other day 45 tablet 3    meclizine 25 MG Oral Tab Take 1 tablet (25 mg total) by mouth 3 (three) times daily as needed for Dizziness. (Patient not taking: Reported on 6/21/2025) 21 tablet 0    Multiple Vitamin (THERA/BETA-CAROTENE) Oral Tab Take 1 tablet by mouth daily. (Patient not taking: Reported on 6/21/2025) 90 tablet 3    triamcinolone 0.1 % External Ointment Apply 1 Application topically 2 (two) times daily. (Patient not taking: Reported on 6/21/2025) 453.6 g 1    Zolmitriptan 2.5 MG Oral Tab Take one tablet if needed for migraine 2 times a day. (Patient not taking: Reported on 6/21/2025) 18 tablet 3     No current facility-administered medications for this visit.       Review of Systems     GENERAL HEALTH: feels well otherwise  SKIN: denies any unusual skin lesions or rashes  RESPIRATORY: denies  shortness of breath with exertion  CARDIOVASCULAR: denies chest pain on exertion  GI: denies abdominal pain and denies heartburn  : denies any burning with urination, urinary frequency or urgency  NEURO: denies headaches, numbness or tingling, mental status changes  PSYCH: denies depressed mood, anxiety  MUSC: denies muscle aches, joint pain    Physical Exam     /82 (BP Location: Right arm, Patient Position: Sitting, Cuff Size: adult)   Pulse 76   Ht 5' 2\" (1.575 m)   Wt 95 lb 8 oz (43.3 kg)   LMP  (LMP Unknown)   SpO2 93%   BMI 17.47 kg/m²     GENERAL: well developed, well nourished,in no apparent distress  SKIN: no rashes,no suspicious lesions  HEENT: atraumatic, normocephalic,ears and throat are clear  NECK: supple,no adenopathy,no bruits  LUNGS: clear to auscultation  CARDIO: RRR without murmur  GI: good BS's,no masses, HSM or tenderness  EXTREMITIES: no cyanosis, clubbing or edema    Assessment and Plan     Assessment & Plan  Hypercholesterolemia  She is on atorvastatin 10 mg every other night due to muscle pain with more frequent dosing. Cholesterol levels were acceptable in February. Every other night dosing is deemed sufficient to maintain cholesterol levels without causing muscle pain.  Plan to recheck cholesterol panel in the fall with next physical.  Orders:    CBC With Differential With Platelet; Future    Comp Metabolic Panel (14); Future    Lipid Panel; Future    TSH W Reflex To Free T4; Future    Vitamin D; Future    Hx of migraines  Migraines occur infrequently, approximately once every ten years. She uses zolmitriptan as needed, very rarely.  She continues on aspirin 81 mg daily.  Orders:    CBC With Differential With Platelet; Future    Comp Metabolic Panel (14); Future    Lipid Panel; Future    TSH W Reflex To Free T4; Future    Vitamin D; Future    Primary osteoarthritis of left knee  Knee pain and hand arthritis present. Previous thumb surgery was successful. Ibuprofen provides  relief. No current need for further intervention unless disability increases. Surgery is an option if disability becomes significant, but occupational therapy and joint injections are alternatives.  - Continue ibuprofen as needed  - Consider occupational therapy if symptoms worsen  Orders:    CBC With Differential With Platelet; Future    Comp Metabolic Panel (14); Future    Lipid Panel; Future    TSH W Reflex To Free T4; Future    Vitamin D; Future    Osteopenia, unspecified location  Has refused bone scans.  Patient recommended to continue vitamin D/calcium supplementation, in the form of a multivitamin every other day is fine.  Will plan to recheck vitamin D levels with her next physical.  Orders:    CBC With Differential With Platelet; Future    Comp Metabolic Panel (14); Future    Lipid Panel; Future    TSH W Reflex To Free T4; Future    Vitamin D; Future    Sensorineural hearing loss (SNHL) of both ears  Does not wear hearing aids at this point.  Orders:    CBC With Differential With Platelet; Future    Comp Metabolic Panel (14); Future    Lipid Panel; Future    TSH W Reflex To Free T4; Future    Vitamin D; Future    Decreased appetite  Anosmia  Reports lack of appetite, potential post-COVID anosmia/dysosmia affecting taste and smell. No significant weight loss or functional impairment. Experiences phantom odors. ENT referral considered if symptoms persist or worsen, but may resolve over time as post-viral symptoms often improve within one to two years.  - Consider ENT referral if symptoms persist or worsen  Orders:    CBC With Differential With Platelet; Future    Comp Metabolic Panel (14); Future    Lipid Panel; Future    TSH W Reflex To Free T4; Future    Vitamin D; Future    PVC's (premature ventricular contractions)  Cardiac testing as above, overall within normal limits.  Normal cardiopulmonary exam today.  Orders:    CBC With Differential With Platelet; Future    Comp Metabolic Panel (14); Future     Lipid Panel; Future    TSH W Reflex To Free T4; Future    Vitamin D; Future    Encounter for screening mammogram for breast cancer  Ordered today.  Needs to have mammogram done after 11/5/2025.  Orders:    CBC With Differential With Platelet; Future    Comp Metabolic Panel (14); Future    Lipid Panel; Future    [facility] NAV 2D+3D SCREENING BILAT (CPT=77067/13125); Future    TSH W Reflex To Free T4; Future    Vitamin D; Future    Hemorrhoids, unspecified hemorrhoid type  S/P laparoscopic-assisted sigmoidectomy  History of diverticulosis  Constipation, unspecified constipation type  Patient notes small amount of bleeding on the toilet paper after she wipes after a bowel movement.  Likely hemorrhoidal.  Will continue to monitor, and consider repeat colonoscopy versus referral to GI to the bleeding worsen. Constipation is improving with increased fruit intake. Sigmoidectomy in 2013 for diverticulitis.   Orders:    CBC With Differential With Platelet; Future    Comp Metabolic Panel (14); Future    Lipid Panel; Future    TSH W Reflex To Free T4; Future    Vitamin D; Future             Current Medications[6]    Requested Prescriptions      No prescriptions requested or ordered in this encounter       Orders Placed This Encounter   Procedures    CBC With Differential With Platelet     Standing Status:   Future     Expected Date:   9/21/2025     Expiration Date:   6/21/2026     Release to patient:   Immediate    Comp Metabolic Panel (14)     Standing Status:   Future     Expected Date:   9/21/2025     Expiration Date:   6/21/2026    Lipid Panel     Standing Status:   Future     Expected Date:   9/21/2025     Expiration Date:   6/21/2026     Release to patient:   Immediate    TSH W Reflex To Free T4     Standing Status:   Future     Expected Date:   9/21/2025     Expiration Date:   6/21/2026     Release to patient:   Immediate    Vitamin D     Standing Status:   Future     Expected Date:   9/21/2025     Expiration Date:    6/21/2026     Please pick the scenario that best fits the purpose for ordering this test:   General Screening/Vit D deficiency (25-Hydroxy)     Release to patient:   Immediate       Return in about 4 months (around 10/21/2025).    The patient indicates understanding of these issues and agrees to the plan.    Electronically signed by Catalina Gates MD 06/21/25             [1]   Past Medical History:   Acute meniscal tear of left knee    Dr Gong-herminia shot and PT.     Arthritis    Diverticulosis    colonoscopy Dr. Mesa 2010    Diverticulosis large intestine w/o perforation or abscess w/o bleeding    DJD (degenerative joint disease) of thoracic spine    MRI T spine in 1/15--right upper back pain resolved.     High cholesterol    Hives    testing per Dr. Hernandez--from fish oil.    Hx of motion sickness    Hyperlipidemia    Inflammatory arthritis    Internal hemorrhoids    colonoscopy Dr. Mesa 2010    Left knee DJD    Lumbar degenerative disc disease    Xray lumbar--9/3/21-mod rotary dextroscoliosis of mid lumbar spine, disc space narrowing L1-2 L 4-5.    Migraines    MRI brain 2003    Nodule of colon    Osteoarthritis    Osteopenia    dexa 2015    Phlegmon    hosp. 8/2012 for sigmoid divertiulitis with phlegmon    Pulsatile tinnitus    Left ear eval by Dr. Rivera.     PVC's (premature ventricular contractions)    echo and stress echo ok in 2019    Rheumatic fever    S/P colonoscopic polypectomy    S/P left hemicolectomy    Sigmoid diverticulitis    recurrent; hosp. 2012 -sigmoid resection 2013    Visual impairment    readers   [2]   Past Surgical History:  Procedure Laterality Date    Cataract  2008    Cataract extraction Bilateral 2006    Dr Mckeon    Colon surgery  04/2013    laparoscopic sigmoid resection-Dr Del Rosario for diverticulitis    Colonoscopy  01/2010    colonoscopy Dr. Mesa     Colonoscopy N/A 10/25/2018    Procedure: COLONOSCOPY;  Surgeon: Madie Morales MD;  Location: [facility] ENDOSCOPY     Colonoscopy  2024    Dr. George; diverticulosis, colon polyp, sigmoid anastomosis, lipoma    Colonoscopy N/A 2024    Procedure: COLONOSCOPY;  Surgeon: Preet George MD;  Location: [facility] ENDOSCOPY    Egd  2024    Dr. George; gastric erythema, hiatal hernia    Hernia surgery      Inguinal hernia repair  2014    repair of incarcerated incisional hernia L groin Dr. Del Rosario      1966    Other surgical history Right 2017    R thumb cyst surgery Dr. Jesus Tesfaye 3/2017-for ganglion cyst and bone spurs.    Other surgical history Right 2024    Right thumb carpometacarpal joint arthroplasty with trapeziectomy and ligament reconstruction    Tubal ligation     [3]   Family History  Problem Relation Age of Onset    Other ( unknown cause age 60) Father     Breast Cancer Mother 75    Lipids Mother     Lung Disorder Mother     Other (COPD) Mother          age 81    Diabetes Son    [4]   Social History  Socioeconomic History    Marital status:    Tobacco Use    Smoking status: Never    Smokeless tobacco: Never   Vaping Use    Vaping status: Never Used   Substance and Sexual Activity    Alcohol use: No    Drug use: Never   Other Topics Concern    Caffeine Concern Yes     Comment: Coffee 1-3 cups daily; Tea    Reaction to local anesthetic No   [5]   Allergies  Allergen Reactions    Fish Oil HIVES     Fish oil caused hives (--testing per allergist).     Shellfish-Derived Products SWELLING     Facial swelling after shellfish as a teenager.    Sulfanilamide OTHER (SEE COMMENTS)     Other reaction(s): Tingling lips   [6]    atorvastatin 10 MG Oral Tab Take 1 tablet (10 mg total) by mouth every other day 45 tablet 3    aspirin 81 MG Oral Tab Take 1 tablet (81 mg total) by mouth daily.

## 2025-06-21 NOTE — ASSESSMENT & PLAN NOTE
She is on atorvastatin 10 mg every other night due to muscle pain with more frequent dosing. Cholesterol levels were acceptable in February. Every other night dosing is deemed sufficient to maintain cholesterol levels without causing muscle pain.  Plan to recheck cholesterol panel in the fall with next physical.  Orders:    CBC With Differential With Platelet; Future    Comp Metabolic Panel (14); Future    Lipid Panel; Future    TSH W Reflex To Free T4; Future    Vitamin D; Future

## 2025-06-21 NOTE — ASSESSMENT & PLAN NOTE
Has refused bone scans.  Patient recommended to continue vitamin D/calcium supplementation, in the form of a multivitamin every other day is fine.  Will plan to recheck vitamin D levels with her next physical.  Orders:    CBC With Differential With Platelet; Future    Comp Metabolic Panel (14); Future    Lipid Panel; Future    TSH W Reflex To Free T4; Future    Vitamin D; Future

## 2025-06-21 NOTE — ASSESSMENT & PLAN NOTE
Does not wear hearing aids at this point.  Orders:    CBC With Differential With Platelet; Future    Comp Metabolic Panel (14); Future    Lipid Panel; Future    TSH W Reflex To Free T4; Future    Vitamin D; Future

## 2025-06-21 NOTE — ASSESSMENT & PLAN NOTE
Patient notes small amount of bleeding on the toilet paper after she wipes after a bowel movement.  Likely hemorrhoidal.  Will continue to monitor, and consider repeat colonoscopy versus referral to GI to the bleeding worsen. Constipation is improving with increased fruit intake. Sigmoidectomy in 2013 for diverticulitis.   Orders:    CBC With Differential With Platelet; Future    Comp Metabolic Panel (14); Future    Lipid Panel; Future    TSH W Reflex To Free T4; Future    Vitamin D; Future

## (undated) DIAGNOSIS — R09.81 NASAL CONGESTION: ICD-10-CM

## (undated) DIAGNOSIS — R05.9 COUGH: Primary | ICD-10-CM

## (undated) DEVICE — GIJAW SINGLE-USE BIOPSY FORCEPS WITH NEEDLE: Brand: GIJAW

## (undated) DEVICE — SUT VCRL 2-0 27IN FS-1 ABSRB UD L24MM 3/8 CIR

## (undated) DEVICE — STERILE TETRA-FLEX CF, ELASTIC BANDAGE, 2" X 5.5YD: Brand: TETRA-FLEX™CF

## (undated) DEVICE — CONMED SCOPE SAVER BITE BLOCK, 20X27 MM: Brand: SCOPE SAVER

## (undated) DEVICE — YANKAUER,BULB TIP,W/O VENT,RIGID,STERILE: Brand: MEDLINE

## (undated) DEVICE — MEDI-VAC NON-CONDUCTIVE SUCTION TUBING 6MM X 1.8M (6FT.) L: Brand: CARDINAL HEALTH

## (undated) DEVICE — WEBRIL COTTON UNDERCAST PADDING: Brand: WEBRIL

## (undated) DEVICE — OCCLUSIVE GAUZE STRIP,3% BISMUTH TRIBROMOPHENATE IN PETROLATUM BLEND: Brand: XEROFORM

## (undated) DEVICE — ENCORE® LATEX MICRO SIZE 8, STERILE LATEX POWDER-FREE SURGICAL GLOVE: Brand: ENCORE

## (undated) DEVICE — MATERIAL SPLNT 3X12IN POLY CMFRT PRE CUT

## (undated) DEVICE — SUT ETHLN 4-0 18IN PS-2 NABSRB BLK 19MM 3/8 C

## (undated) DEVICE — SPONGE GZ 4X4IN COT 12 PLY TYP VII WVN C

## (undated) DEVICE — 3M™ IOBAN™ 2 ANTIMICROBIAL INCISE DRAPE 6640EZ: Brand: IOBAN™ 2

## (undated) DEVICE — ENDOSCOPY PACK - LOWER: Brand: MEDLINE INDUSTRIES, INC.

## (undated) DEVICE — KIT ENDO ORCAPOD 160/180/190

## (undated) DEVICE — UPPER EXTREMITY: Brand: MEDLINE INDUSTRIES, INC.

## (undated) DEVICE — MEDI-VAC NON-CONDUCTIVE SUCTION TUBING: Brand: CARDINAL HEALTH

## (undated) DEVICE — FORCEP RADIAL JAW 4

## (undated) DEVICE — 3M™ IOBAN™ 2 ANTIMICROBIAL INCISE DRAPE 6650EZ: Brand: IOBAN™ 2

## (undated) DEVICE — Device: Brand: DUAL NARE NASAL CANNULAE FEMALE LUER CON 7FT O2 TUBE

## (undated) DEVICE — C-ARM: Brand: UNBRANDED

## (undated) DEVICE — 60 ML SYRINGE REGULAR TIP: Brand: MONOJECT

## (undated) DEVICE — INTENDED FOR TISSUE SEPARATION, AND OTHER PROCEDURES THAT REQUIRE A SHARP SURGICAL BLADE TO PUNCTURE OR CUT.: Brand: BARD-PARKER ® STAINLESS STEEL BLADES

## (undated) DEVICE — GAMMEX® PI HYBRID SIZE 8, STERILE POWDER-FREE SURGICAL GLOVE, POLYISOPRENE AND NEOPRENE BLEND: Brand: GAMMEX

## (undated) DEVICE — TETRA-FLEX CF WOVEN LATEX FREE ELASTIC BANDAGE 4" X 5.5 YD: Brand: TETRA-FLEX™CF

## (undated) DEVICE — PENCIL SMK EVAC L10FT MPLR BLDE JAW OPN

## (undated) DEVICE — ANTIBACTERIAL UNDYED BRAIDED (POLYGLACTIN 910), SYNTHETIC ABSORBABLE SUTURE: Brand: COATED VICRYL

## (undated) DEVICE — DISPOSABLE TOURNIQUET CUFF SINGLE BLADDER, DUAL PORT AND QUICK CONNECT CONNECTOR: Brand: COLOR CUFF

## (undated) DEVICE — GAUZE,SPONGE,4"X4",16PLY,XRAY,STRL,LF: Brand: MEDLINE

## (undated) DEVICE — KIT CLEAN ENDOKIT 1.1OZ GOWNX2

## (undated) DEVICE — SOLUTION IRRIG 1000ML 0.9% NACL USP BTL

## (undated) DEVICE — Device: Brand: DEFENDO AIR/WATER/SUCTION AND BIOPSY VALVE

## (undated) DEVICE — SUT ETHLN 3-0 18IN PS-2 NABSRB BLK 19MM 3/8 C

## (undated) DEVICE — APPLICATOR PREP 26ML CHG 2% ISO ALC 70%

## (undated) NOTE — MR AVS SNAPSHOT
[address]  [phone]  [phone]               Thank you for choosing us for your health care visit with Rl Navarrete OT.   We are glad to serve you and happy to provide you with [facility] Visit By Therapist with Steven Hernandez, [facility] ([address] [facility])    [address]   [phone]           Please arrive at your schedul

## (undated) NOTE — MR AVS SNAPSHOT
[place]  [address]  [phone]               Thank you for choosing us for your health care visit with Macky Boeck, Au.D.   We are glad to serve you and happy to provide you with this summary You can access your MyChart to more actively manage your health care and view more details from this visit by going to [email or web address].   If you've recently had a stay at the Hospital you can access your discharge instructions in [address] by mary

## (undated) NOTE — LETTER
[facility]  [address], [place], [place]  Authorization for Surgical Operation and Procedure                                                                                           I hereby authorize Preet George MD, my physician and his/her assistants (if applicable), which may include medical students, residents, and/or fellows, to perform the following surgical operation/ procedure and administer such anesthesia as may be determined necessary by my physician: Operation/Procedure name (s) COLONOSCOPY/ESOPHAGOGASTRODUODENOSCOPY on Kaylyn Serra   2.   I recognize that during the surgical operation/procedure, unforeseen conditions may necessitate additional or different procedures than those listed above.  I, therefore, further authorize and request that the above-named surgeon, assistants, or designees perform such procedures as are, in their judgment, necessary and desirable.    3.   My surgeon/physician has discussed prior to my surgery the potential benefits, risks and side effects of this procedure; the likelihood of achieving goals; and potential problems that might occur during recuperation.  They also discussed reasonable alternatives to the procedure, including risks, benefits, and side effects related to the alternatives and risks related to not receiving this procedure.  I have had all my questions answered and I acknowledge that no guarantee has been made as to the result that may be obtained.    4.   Should the need arise during my operation/procedure, which includes change of level of care prior to discharge, I also consent to the administration of blood and/or blood products.  Further, I understand that despite careful testing and screening of blood or blood products by collecting agencies, I may still be subject to ill effects as a result of receiving a blood transfusion and/or blood products.  The following are some, but not all, of the potential risks that can  occur: fever and allergic reactions, hemolytic reactions, transmission of diseases such as Hepatitis, AIDS and Cytomegalovirus (CMV) and fluid overload.  In the event that I wish to have an autologous transfusion of my own blood, or a directed donor transfusion, I will discuss this with my physician.  Check only if Refusing Blood or Blood Products  I understand refusal of blood or blood products as deemed necessary by my physician may have serious consequences to my condition to include possible death. I hereby assume responsibility for my refusal and release the hospital, its personnel, and my physicians from any responsibility for the consequences of my refusal.    o  Refuse   5.   I authorize the use of any specimen, organs, tissues, body parts or foreign objects that may be removed from my body during the operation/procedure for diagnosis, research or teaching purposes and their subsequent disposal by hospital authorities.  I also authorize the release of specimen test results and/or written reports to my treating physician on the hospital medical staff or other referring or consulting physicians involved in my care, at the discretion of the Pathologist or my treating physician.    6.   I consent to the photographing or videotaping of the operations or procedures to be performed, including appropriate portions of my body for medical, scientific, or educational purposes, provided my identity is not revealed by the pictures or by descriptive texts accompanying them.  If the procedure has been photographed/videotaped, the surgeon will obtain the original picture, image, videotape or CD.  The hospital will not be responsible for storage, release or maintenance of the picture, image, tape or CD.    7.   I consent to the presence of a  or observers in the operating room as deemed necessary by my physician or their designees.    8.   I recognize that in the event my procedure results in extended  X-Ray/fluoroscopy time, I may develop a skin reaction.    9. If I have a Do Not Attempt Resuscitation (DNAR) order in place, that status will be suspended while in the operating room, procedural suite, and during the recovery period unless otherwise explicitly stated by me (or a person authorized to consent on my behalf). The surgeon or my attending physician will determine when the applicable recovery period ends for purposes of reinstating the DNAR order.  10. Patients having a sterilization procedure: I understand that if the procedure is successful the results will be permanent and it will therefore be impossible for me to inseminate, conceive, or bear children.  I also understand that the procedure is intended to result in sterility, although the result has not been guaranteed.   11. I acknowledge that my physician has explained sedation/analgesia administration to me including the risk and benefits I consent to the administration of sedation/analgesia as may be necessary or desirable in the judgment of my physician.    I CERTIFY THAT I HAVE READ AND FULLY UNDERSTAND THE ABOVE CONSENT TO OPERATION and/or OTHER PROCEDURE.     _________________________________________ _________________________________     ___________________________________  Signature of Patient     Signature of Responsible Person                   Printed Name of Responsible Person                              _________________________________________ ______________________________        ___________________________________  Signature of Witness         Date  Time         Relationship to Patient    STATEMENT OF PHYSICIAN My signature below affirms that prior to the time of the procedure; I have explained to the patient and/or his/her legal representative, the risks and benefits involved in the proposed treatment and any reasonable alternative to the proposed treatment. I have also explained the risks and benefits involved in refusal of the  proposed treatment and alternatives to the proposed treatment and have answered the patient's questions. If I have a significant financial interest in a co-management agreement or a significant financial interest in any product or implant, or other significant relationship used in this procedure/surgery, I have disclosed this and had a discussion with my patient.     _______________________________________________________________ _____________________________  (Signature of Physician)                                                                                         (Date)                                   (Time)  Patient Name: Kaylyn Pierce Maryse    : 1945   Printed: 2024      Medical Record #: [id number]                                              Page 1 of 1

## (undated) NOTE — LETTER
8/25/2023    Kaylyn Serra        [address]        [facility] [address]            Dear Shaina Langston,      Our records indicate that you are due for an appointment for a Colonoscopy with a physician at [address] [place] [address] - Gastroenterology. Our doctors are booking out about 3-5 months in advance for procedures. Please call our office to schedule a phone screening appointment to plan for the procedure(s). Your medical well-being is important to us. If your insurance requires a referral, please call your primary care office to request one.       Thank you,      The Physicians and Staff at [facility]

## (undated) NOTE — MR AVS SNAPSHOT
[place]  [address]  [phone]               Thank you for choosing us for your health care visit with Ge Trammell MD.  We are glad to serve you and happy to provide you with this summary These medications were sent to [address] [place] [address] - [facility]  [address] [phone], [address] [address]     Phone:  [phone]    - DiphenhydrAMINE HCl 50 [address] [facility] Orders     Audiology Referral - may be held responsible for payment in full if proper authorization is not acquired. Please contact the Patient Business Office at [phone] if you have any questions related to insurance coverage. Thank you.        Wednesday January 11, 2017     Imaging authorization, such as [place], please feel free to schedule your appointment immediately. However, if you are unsure about the requirements for authorization, please wait 5-7 days and then contact your physician's   office.  At that time, you josep

## (undated) NOTE — MR AVS SNAPSHOT
Etienne Enciso [address]  [phone]  [phone]               Thank you for choosing us for your health care visit with Jenni Edouard OT.   We are glad to serve you and happy to provide you with [facility] Visit By Therapist with Steven Hernandez, 55 [facility] ([facility])    [address]   [phone]           Please arrive at your schedul

## (undated) NOTE — LETTER
[facility]  Administration of Anesthesia  IKaylyn agree to be cared for by a physician anesthesiologist alone and/or with a nurse anesthetist, who is specially trained to monitor me and give me medicine to put me to sleep or keep me comfortable during my procedure    I understand that my anesthesiologist and/or anesthetist is not an employee or agent of [facility] or [employer]. He or she works for [facility]    As the patient asking for anesthesia services, I agree to:  Allow the anesthesiologist (anesthesia doctor) to give me medicine and do additional procedures as necessary. Some examples are: Starting or using an “IV” to give me medicine, fluids or blood during my procedure, and having a breathing tube placed to help me breathe when I’m asleep (intubation). In the event that my heart stops working properly, I understand that my anesthesiologist will make every effort to sustain my life, unless otherwise directed by [facility] Do Not Resuscitate documents.  Tell my anesthesia doctor before my procedure:  If I am pregnant.  The last time that I ate or drank.  iii. All of the medicines I take (including prescriptions, herbal supplements, and pills I can buy without a prescription (including street drugs/illegal medications). Failure to inform my anesthesiologist about these medicines may increase my risk of anesthetic complications.  iv.If I am allergic to anything or have had a reaction to anesthesia before.  I understand how the anesthesia medicine will help me (benefits).  I understand that with any type of anesthesia medicine there are risks:  The most common risks are: nausea, vomiting, sore throat, muscle soreness, damage to my eyes, mouth, or teeth (from breathing tube placement).  Rare risks include: remembering what happened during my procedure, allergic reactions to medications, injury to my airway, heart, lungs, vision,  nerves, or muscles and in extremely rare instances death.  My doctor has explained to me other choices available to me for my care (alternatives).  Pregnant Patients (“epidural”):  I understand that the risks of having an epidural (medicine given into my back to help control pain during labor), include itching, low blood pressure, difficulty urinating, headache or slowing of the baby’s heart. Very rare risks include infection, bleeding, seizure, irregular heart rhythms and nerve injury.  Regional Anesthesia (“spinal”, “epidural”, & “nerve blocks”):  I understand that rare but potential complications include headache, bleeding, infection, seizure, irregular heart rhythms, and nerve injury.    _____________________________________________________________________________  Patient (or Representative) Signature/Relationship to Patient  Date   Time    _____________________________________________________________________________   Name (if used)    Language/Organization   Time    _____________________________________________________________________________  Nurse Anesthetist Signature     Date   Time  _____________________________________________________________________________  Anesthesiologist Signature     Date   Time  I have discussed the procedure and information above with the patient (or patient’s representative) and answered their questions. The patient or their representative has agreed to have anesthesia services.    _____________________________________________________________________________  Witness        Date   Time  I have verified that the signature is that of the patient or patient’s representative, and that it was signed before the procedure  Patient Name: Kaylyn Serra     : 1945                 Printed: 2024 at 2:24 PM    Medical Record #: [id number]                                            Page 1 of 1  ----------ANESTHESIA CONSENT----------

## (undated) NOTE — MR AVS SNAPSHOT
Etienne Enciso [address]  [phone]  [phone]               Thank you for choosing us for your health care visit with Phi Beebe OT.   We are glad to serve you and happy to provide you with You can access your MyChart to more actively manage your health care and view more details from this visit by going to [email or web address].   If you've recently had a stay at the Hospital you can access your discharge instructions in [address] by mary

## (undated) NOTE — Clinical Note
No referring provider defined for this encounter. 01/11/2017        Patient: Rohit Zaman   YOB: 1945   Date of Visit: 1/11/2017       Dear  Dr. Margarita Salamanca MD,      Thank you for referring Rohit Zaman to my practice.   Sh

## (undated) NOTE — MR AVS SNAPSHOT
Etienne Enciso [address]  [phone]  [phone]               Thank you for choosing us for your health care visit with Dl Valerio OT.   We are glad to serve you and happy to provide you with Visits < Visit Summaries. MyChart questions? Call [phone] for help. [facility] is NOT to be used for urgent needs. For medical emergencies, dial 911.

## (undated) NOTE — MR AVS SNAPSHOT
Etienne Enciso [address] 2000 [facility] [address] [facility]  [phone]  [phone]               Thank you for choosing us for your health care visit with Sameera Toussaint OT.   We are glad to serve you and happy to provide you with [facility] Visit By Therapist with Sravan Clement, 55 [facility] ([facility])    [address]   [phone]           Please arrive at your schedul

## (undated) NOTE — MR AVS SNAPSHOT
After Visit Summary   2/5/2021    Katy Nunez    MRN: [id number]           Visit Information     Date & Time  2/5/2021  7:30 AM Provider  Leo Urena MD [address] Dept[address]  Phone  [phone] URINALYSIS, ROUTINE [[id number] CUSTOM]  2/5/2021 2/5/2022    URINE CULTURE, ROUTINE [[id number] CUSTOM]  2/5/2021 (Approximate) 2/5/2022    US PELVIS (TRANSVAGINAL PELVIS) (CPT=76830) [28338 CPT(R)]  2/5/2021 (Approximate) 2/5/2022      Future Appointments Communicate with a [facility] Physician or [facility] online. The physician will respond and provide   a treatment plan within a few hours.  ONLINE VISIT  Primary Care Providers  Treatment for mild illness or injury that does not require immediate attention VIDEO VISITS

## (undated) NOTE — MR AVS SNAPSHOT
Etienne Enciso [address] 2000 [facility] [address]  [phone]  [phone]               Thank you for choosing us for your health care visit with Steven Hernandez OT.   We are glad to serve you and happy to provide you with [email or web address]. If you've recently had a stay at the Hospital you can access your discharge instructions in [facility] by going to Visits < Admission Summaries.  If you've been to the Emergency Department or your doctor's office, you can view yo